# Patient Record
Sex: MALE | Race: WHITE | NOT HISPANIC OR LATINO | Employment: FULL TIME | URBAN - METROPOLITAN AREA
[De-identification: names, ages, dates, MRNs, and addresses within clinical notes are randomized per-mention and may not be internally consistent; named-entity substitution may affect disease eponyms.]

---

## 2020-03-13 ENCOUNTER — HOSPITAL ENCOUNTER (OUTPATIENT)
Facility: HOSPITAL | Age: 60
Setting detail: SURGERY ADMIT
End: 2020-03-13
Attending: ORTHOPAEDIC SURGERY | Admitting: ORTHOPAEDIC SURGERY
Payer: COMMERCIAL

## 2020-03-17 ENCOUNTER — HOSPITAL ENCOUNTER (OUTPATIENT)
Dept: NON INVASIVE DIAGNOSTICS | Facility: HOSPITAL | Age: 60
Discharge: HOME | End: 2020-03-17
Attending: ORTHOPAEDIC SURGERY
Payer: COMMERCIAL

## 2020-03-17 ENCOUNTER — HOSPITAL ENCOUNTER (OUTPATIENT)
Dept: RADIOLOGY | Facility: HOSPITAL | Age: 60
Discharge: HOME | End: 2020-03-17
Attending: ORTHOPAEDIC SURGERY
Payer: COMMERCIAL

## 2020-03-17 ENCOUNTER — APPOINTMENT (OUTPATIENT)
Dept: LAB | Facility: HOSPITAL | Age: 60
End: 2020-03-17
Attending: ORTHOPAEDIC SURGERY
Payer: COMMERCIAL

## 2020-03-17 ENCOUNTER — APPOINTMENT (OUTPATIENT)
Dept: PREADMISSION TESTING | Facility: HOSPITAL | Age: 60
End: 2020-03-17
Payer: COMMERCIAL

## 2020-03-17 ENCOUNTER — TRANSCRIBE ORDERS (OUTPATIENT)
Dept: ADMINISTRATIVE | Facility: HOSPITAL | Age: 60
End: 2020-03-17

## 2020-03-17 VITALS
HEIGHT: 71 IN | TEMPERATURE: 98.2 F | HEART RATE: 67 BPM | BODY MASS INDEX: 40.54 KG/M2 | WEIGHT: 289.6 LBS | RESPIRATION RATE: 18 BRPM | SYSTOLIC BLOOD PRESSURE: 120 MMHG | DIASTOLIC BLOOD PRESSURE: 70 MMHG | OXYGEN SATURATION: 94 %

## 2020-03-17 DIAGNOSIS — M19.011 ARTHRITIS OF RIGHT SHOULDER REGION: ICD-10-CM

## 2020-03-17 DIAGNOSIS — Z01.818 OTHER SPECIFIED PRE-OPERATIVE EXAMINATION: ICD-10-CM

## 2020-03-17 DIAGNOSIS — M19.011 ARTHRITIS OF RIGHT SHOULDER REGION: Primary | ICD-10-CM

## 2020-03-17 LAB
ALBUMIN SERPL BCP-MCNC: 3.5 G/DL (ref 3.5–5)
ALP SERPL-CCNC: 91 U/L (ref 46–116)
ALT SERPL W P-5'-P-CCNC: 24 U/L (ref 12–78)
ANION GAP SERPL CALCULATED.3IONS-SCNC: 6 MMOL/L (ref 4–13)
AST SERPL W P-5'-P-CCNC: 19 U/L (ref 5–45)
ATRIAL RATE: 60 BPM
BASOPHILS # BLD AUTO: 0.05 THOUSANDS/ΜL (ref 0–0.1)
BASOPHILS NFR BLD AUTO: 1 % (ref 0–1)
BILIRUB SERPL-MCNC: 0.47 MG/DL (ref 0.2–1)
BILIRUB UR QL STRIP: NEGATIVE
BUN SERPL-MCNC: 15 MG/DL (ref 5–25)
CALCIUM SERPL-MCNC: 9 MG/DL (ref 8.3–10.1)
CHLORIDE SERPL-SCNC: 103 MMOL/L (ref 100–108)
CLARITY UR: CLEAR
CO2 SERPL-SCNC: 31 MMOL/L (ref 21–32)
COLOR UR: YELLOW
CREAT SERPL-MCNC: 0.8 MG/DL (ref 0.6–1.3)
EOSINOPHIL # BLD AUTO: 0.18 THOUSAND/ΜL (ref 0–0.61)
EOSINOPHIL NFR BLD AUTO: 3 % (ref 0–6)
ERYTHROCYTE [DISTWIDTH] IN BLOOD BY AUTOMATED COUNT: 13.1 % (ref 11.6–15.1)
GFR SERPL CREATININE-BSD FRML MDRD: 98 ML/MIN/1.73SQ M
GLUCOSE SERPL-MCNC: 114 MG/DL (ref 65–140)
GLUCOSE UR STRIP-MCNC: NEGATIVE MG/DL
HCT VFR BLD AUTO: 45.7 % (ref 36.5–49.3)
HGB BLD-MCNC: 14.1 G/DL (ref 12–17)
HGB UR QL STRIP.AUTO: NEGATIVE
IMM GRANULOCYTES # BLD AUTO: 0.02 THOUSAND/UL (ref 0–0.2)
IMM GRANULOCYTES NFR BLD AUTO: 0 % (ref 0–2)
INR PPP: 0.98 (ref 0.84–1.19)
KETONES UR STRIP-MCNC: NEGATIVE MG/DL
LEUKOCYTE ESTERASE UR QL STRIP: NEGATIVE
LYMPHOCYTES # BLD AUTO: 1.16 THOUSANDS/ΜL (ref 0.6–4.47)
LYMPHOCYTES NFR BLD AUTO: 20 % (ref 14–44)
MCH RBC QN AUTO: 28.4 PG (ref 26.8–34.3)
MCHC RBC AUTO-ENTMCNC: 30.9 G/DL (ref 31.4–37.4)
MCV RBC AUTO: 92 FL (ref 82–98)
MONOCYTES # BLD AUTO: 0.59 THOUSAND/ΜL (ref 0.17–1.22)
MONOCYTES NFR BLD AUTO: 10 % (ref 4–12)
NEUTROPHILS # BLD AUTO: 3.85 THOUSANDS/ΜL (ref 1.85–7.62)
NEUTS SEG NFR BLD AUTO: 66 % (ref 43–75)
NITRITE UR QL STRIP: NEGATIVE
NRBC BLD AUTO-RTO: 0 /100 WBCS
P AXIS: 40 DEGREES
PH UR STRIP.AUTO: 6 [PH]
PLATELET # BLD AUTO: 271 THOUSANDS/UL (ref 149–390)
PMV BLD AUTO: 9.6 FL (ref 8.9–12.7)
POTASSIUM SERPL-SCNC: 4.2 MMOL/L (ref 3.5–5.3)
PR INTERVAL: 158 MS
PROT SERPL-MCNC: 7.5 G/DL (ref 6.4–8.2)
PROT UR STRIP-MCNC: NEGATIVE MG/DL
PROTHROMBIN TIME: 13.1 SECONDS (ref 11.6–14.5)
QRS AXIS: -11 DEGREES
QRSD INTERVAL: 88 MS
QT INTERVAL: 390 MS
QTC INTERVAL: 390 MS
RBC # BLD AUTO: 4.97 MILLION/UL (ref 3.88–5.62)
SODIUM SERPL-SCNC: 140 MMOL/L (ref 136–145)
SP GR UR STRIP.AUTO: 1.02 (ref 1–1.03)
T WAVE AXIS: 35 DEGREES
UROBILINOGEN UR QL STRIP.AUTO: 0.2 E.U./DL
VENTRICULAR RATE: 60 BPM
WBC # BLD AUTO: 5.85 THOUSAND/UL (ref 4.31–10.16)

## 2020-03-17 PROCEDURE — 93005 ELECTROCARDIOGRAM TRACING: CPT

## 2020-03-17 PROCEDURE — 93010 ELECTROCARDIOGRAM REPORT: CPT | Performed by: INTERNAL MEDICINE

## 2020-03-17 PROCEDURE — 85025 COMPLETE CBC W/AUTO DIFF WBC: CPT

## 2020-03-17 PROCEDURE — 36415 COLL VENOUS BLD VENIPUNCTURE: CPT

## 2020-03-17 PROCEDURE — 80053 COMPREHEN METABOLIC PANEL: CPT

## 2020-03-17 PROCEDURE — 85610 PROTHROMBIN TIME: CPT

## 2020-03-17 PROCEDURE — 81003 URINALYSIS AUTO W/O SCOPE: CPT | Performed by: ORTHOPAEDIC SURGERY

## 2020-03-17 PROCEDURE — 71046 X-RAY EXAM CHEST 2 VIEWS: CPT

## 2020-03-17 RX ORDER — ACETAMINOPHEN 500 MG
1000 TABLET ORAL EVERY 6 HOURS PRN
COMMUNITY

## 2020-03-17 RX ORDER — SODIUM CHLORIDE 9 MG/ML
125 INJECTION, SOLUTION INTRAVENOUS CONTINUOUS
Status: CANCELLED | OUTPATIENT
Start: 2020-03-17

## 2020-03-17 RX ORDER — ATORVASTATIN CALCIUM 80 MG/1
80 TABLET, FILM COATED ORAL DAILY
COMMUNITY

## 2020-03-17 RX ORDER — ASPIRIN 81 MG/1
81 TABLET ORAL DAILY
COMMUNITY
End: 2020-05-23 | Stop reason: HOSPADM

## 2020-03-17 RX ORDER — AMLODIPINE BESYLATE 5 MG/1
5 TABLET ORAL DAILY
COMMUNITY

## 2020-03-17 RX ORDER — METFORMIN HYDROCHLORIDE 500 MG/1
500 TABLET, FILM COATED, EXTENDED RELEASE ORAL EVERY EVENING
COMMUNITY

## 2020-03-17 NOTE — PRE-PROCEDURE INSTRUCTIONS
Pre-Surgery Instructions:   Medication Instructions    acetaminophen (TYLENOL) 500 mg tablet Instructed patient per Anesthesia Guidelines   amLODIPine (NORVASC) 5 mg tablet Instructed patient per Anesthesia Guidelines   aspirin (ECOTRIN LOW STRENGTH) 81 mg EC tablet Patient was instructed to contact Physician for medication instruction   atorvastatin (LIPITOR) 80 mg tablet Instructed patient per Anesthesia Guidelines   metFORMIN (GLUMETZA) 500 MG (MOD) 24 hr tablet Instructed patient per Anesthesia Guidelines   metoprolol tartrate (LOPRESSOR) 25 mg tablet Instructed patient per Anesthesia Guidelines  Instructed to take metoprolol and amlodipine am of surgery with sip val dial per anesthesia DR Melissa Lilly

## 2020-05-13 RX ORDER — CEFAZOLIN SODIUM 2 G/50ML
2000 SOLUTION INTRAVENOUS ONCE
Status: CANCELLED | OUTPATIENT
Start: 2020-05-22 | End: 2020-05-13

## 2020-05-15 DIAGNOSIS — Z11.59 SCREENING FOR VIRAL DISEASE: ICD-10-CM

## 2020-05-15 PROCEDURE — U0003 INFECTIOUS AGENT DETECTION BY NUCLEIC ACID (DNA OR RNA); SEVERE ACUTE RESPIRATORY SYNDROME CORONAVIRUS 2 (SARS-COV-2) (CORONAVIRUS DISEASE [COVID-19]), AMPLIFIED PROBE TECHNIQUE, MAKING USE OF HIGH THROUGHPUT TECHNOLOGIES AS DESCRIBED BY CMS-2020-01-R: HCPCS

## 2020-05-19 LAB — SARS-COV-2 RNA SPEC QL NAA+PROBE: NOT DETECTED

## 2020-05-21 ENCOUNTER — ANESTHESIA EVENT (OUTPATIENT)
Dept: PERIOP | Facility: HOSPITAL | Age: 60
DRG: 483 | End: 2020-05-21
Payer: COMMERCIAL

## 2020-05-22 ENCOUNTER — HOSPITAL ENCOUNTER (INPATIENT)
Facility: HOSPITAL | Age: 60
LOS: 1 days | Discharge: HOME HEALTH | DRG: 483 | End: 2020-05-23
Attending: ORTHOPAEDIC SURGERY | Admitting: ORTHOPAEDIC SURGERY
Payer: COMMERCIAL

## 2020-05-22 ENCOUNTER — APPOINTMENT (OUTPATIENT)
Dept: RADIOLOGY | Facility: HOSPITAL | Age: 60
DRG: 483 | End: 2020-05-22
Payer: COMMERCIAL

## 2020-05-22 ENCOUNTER — ANESTHESIA (OUTPATIENT)
Dept: PERIOP | Facility: HOSPITAL | Age: 60
DRG: 483 | End: 2020-05-22
Payer: COMMERCIAL

## 2020-05-22 DIAGNOSIS — Z11.59 SCREENING FOR VIRAL DISEASE: ICD-10-CM

## 2020-05-22 DIAGNOSIS — M19.011 PRIMARY OSTEOARTHRITIS OF RIGHT SHOULDER: Primary | ICD-10-CM

## 2020-05-22 LAB
ABO GROUP BLD: NORMAL
ABO GROUP BLD: NORMAL
BLD GP AB SCN SERPL QL: NEGATIVE
GLUCOSE SERPL-MCNC: 117 MG/DL (ref 65–140)
GLUCOSE SERPL-MCNC: 124 MG/DL (ref 65–140)
GLUCOSE SERPL-MCNC: 145 MG/DL (ref 65–140)
RH BLD: POSITIVE
RH BLD: POSITIVE
SPECIMEN EXPIRATION DATE: NORMAL

## 2020-05-22 PROCEDURE — C1776 JOINT DEVICE (IMPLANTABLE): HCPCS | Performed by: ORTHOPAEDIC SURGERY

## 2020-05-22 PROCEDURE — 73020 X-RAY EXAM OF SHOULDER: CPT

## 2020-05-22 PROCEDURE — 82948 REAGENT STRIP/BLOOD GLUCOSE: CPT

## 2020-05-22 PROCEDURE — C1713 ANCHOR/SCREW BN/BN,TIS/BN: HCPCS | Performed by: ORTHOPAEDIC SURGERY

## 2020-05-22 PROCEDURE — 86900 BLOOD TYPING SEROLOGIC ABO: CPT | Performed by: ORTHOPAEDIC SURGERY

## 2020-05-22 PROCEDURE — 86850 RBC ANTIBODY SCREEN: CPT | Performed by: ORTHOPAEDIC SURGERY

## 2020-05-22 PROCEDURE — 0RRJ0JZ REPLACEMENT OF RIGHT SHOULDER JOINT WITH SYNTHETIC SUBSTITUTE, OPEN APPROACH: ICD-10-PCS | Performed by: ORTHOPAEDIC SURGERY

## 2020-05-22 PROCEDURE — 86901 BLOOD TYPING SEROLOGIC RH(D): CPT | Performed by: ORTHOPAEDIC SURGERY

## 2020-05-22 DEVICE — IMPLANTABLE DEVICE: Type: IMPLANTABLE DEVICE | Site: SHOULDER | Status: FUNCTIONAL

## 2020-05-22 DEVICE — GLENOID W/ CLEAT MED: Type: IMPLANTABLE DEVICE | Site: SHOULDER | Status: FUNCTIONAL

## 2020-05-22 DEVICE — DEPUY CMW 2 FAST SET BONE CEMENT 20G: Type: IMPLANTABLE DEVICE | Site: SHOULDER | Status: FUNCTIONAL

## 2020-05-22 RX ORDER — ONDANSETRON 2 MG/ML
INJECTION INTRAMUSCULAR; INTRAVENOUS AS NEEDED
Status: DISCONTINUED | OUTPATIENT
Start: 2020-05-22 | End: 2020-05-22 | Stop reason: SURG

## 2020-05-22 RX ORDER — DOCUSATE SODIUM 100 MG/1
100 CAPSULE, LIQUID FILLED ORAL 2 TIMES DAILY
Status: DISCONTINUED | OUTPATIENT
Start: 2020-05-22 | End: 2020-05-23 | Stop reason: HOSPADM

## 2020-05-22 RX ORDER — GLYCOPYRROLATE 0.2 MG/ML
INJECTION INTRAMUSCULAR; INTRAVENOUS AS NEEDED
Status: DISCONTINUED | OUTPATIENT
Start: 2020-05-22 | End: 2020-05-22 | Stop reason: SURG

## 2020-05-22 RX ORDER — OXYCODONE HYDROCHLORIDE 5 MG/1
5 TABLET ORAL EVERY 4 HOURS PRN
Status: DISCONTINUED | OUTPATIENT
Start: 2020-05-22 | End: 2020-05-23 | Stop reason: HOSPADM

## 2020-05-22 RX ORDER — ROPIVACAINE HYDROCHLORIDE 5 MG/ML
INJECTION, SOLUTION EPIDURAL; INFILTRATION; PERINEURAL AS NEEDED
Status: DISCONTINUED | OUTPATIENT
Start: 2020-05-22 | End: 2020-05-22 | Stop reason: SURG

## 2020-05-22 RX ORDER — NEOSTIGMINE METHYLSULFATE 1 MG/ML
INJECTION INTRAVENOUS AS NEEDED
Status: DISCONTINUED | OUTPATIENT
Start: 2020-05-22 | End: 2020-05-22 | Stop reason: SURG

## 2020-05-22 RX ORDER — MEPERIDINE HYDROCHLORIDE 50 MG/ML
12.5 INJECTION INTRAMUSCULAR; INTRAVENOUS; SUBCUTANEOUS
Status: DISCONTINUED | OUTPATIENT
Start: 2020-05-22 | End: 2020-05-22 | Stop reason: HOSPADM

## 2020-05-22 RX ORDER — CEFAZOLIN SODIUM 1 G/50ML
1000 SOLUTION INTRAVENOUS EVERY 8 HOURS
Status: COMPLETED | OUTPATIENT
Start: 2020-05-22 | End: 2020-05-23

## 2020-05-22 RX ORDER — OXYCODONE HYDROCHLORIDE 10 MG/1
10 TABLET ORAL EVERY 6 HOURS PRN
Status: DISCONTINUED | OUTPATIENT
Start: 2020-05-22 | End: 2020-05-23 | Stop reason: HOSPADM

## 2020-05-22 RX ORDER — ROCURONIUM BROMIDE 10 MG/ML
INJECTION, SOLUTION INTRAVENOUS AS NEEDED
Status: DISCONTINUED | OUTPATIENT
Start: 2020-05-22 | End: 2020-05-22 | Stop reason: SURG

## 2020-05-22 RX ORDER — ASPIRIN 325 MG
325 TABLET ORAL 2 TIMES DAILY
Refills: 0
Start: 2020-05-22

## 2020-05-22 RX ORDER — AMLODIPINE BESYLATE 5 MG/1
5 TABLET ORAL DAILY
Status: DISCONTINUED | OUTPATIENT
Start: 2020-05-23 | End: 2020-05-23 | Stop reason: HOSPADM

## 2020-05-22 RX ORDER — ASPIRIN 325 MG
325 TABLET ORAL 2 TIMES DAILY
Status: DISCONTINUED | OUTPATIENT
Start: 2020-05-22 | End: 2020-05-23 | Stop reason: HOSPADM

## 2020-05-22 RX ORDER — FENTANYL CITRATE/PF 50 MCG/ML
25 SYRINGE (ML) INJECTION
Status: DISCONTINUED | OUTPATIENT
Start: 2020-05-22 | End: 2020-05-22 | Stop reason: HOSPADM

## 2020-05-22 RX ORDER — FENTANYL CITRATE 50 UG/ML
INJECTION, SOLUTION INTRAMUSCULAR; INTRAVENOUS AS NEEDED
Status: DISCONTINUED | OUTPATIENT
Start: 2020-05-22 | End: 2020-05-22 | Stop reason: SURG

## 2020-05-22 RX ORDER — ONDANSETRON 2 MG/ML
4 INJECTION INTRAMUSCULAR; INTRAVENOUS EVERY 6 HOURS PRN
Status: DISCONTINUED | OUTPATIENT
Start: 2020-05-22 | End: 2020-05-23 | Stop reason: HOSPADM

## 2020-05-22 RX ORDER — HYDROMORPHONE HCL/PF 1 MG/ML
0.5 SYRINGE (ML) INJECTION EVERY 2 HOUR PRN
Status: DISCONTINUED | OUTPATIENT
Start: 2020-05-22 | End: 2020-05-23 | Stop reason: HOSPADM

## 2020-05-22 RX ORDER — PROPOFOL 10 MG/ML
INJECTION, EMULSION INTRAVENOUS AS NEEDED
Status: DISCONTINUED | OUTPATIENT
Start: 2020-05-22 | End: 2020-05-22 | Stop reason: SURG

## 2020-05-22 RX ORDER — MAGNESIUM HYDROXIDE 1200 MG/15ML
LIQUID ORAL AS NEEDED
Status: DISCONTINUED | OUTPATIENT
Start: 2020-05-22 | End: 2020-05-22 | Stop reason: HOSPADM

## 2020-05-22 RX ORDER — ACETAMINOPHEN 325 MG/1
650 TABLET ORAL EVERY 6 HOURS PRN
Status: DISCONTINUED | OUTPATIENT
Start: 2020-05-22 | End: 2020-05-23 | Stop reason: HOSPADM

## 2020-05-22 RX ORDER — SENNOSIDES 8.6 MG
1 TABLET ORAL DAILY
Status: DISCONTINUED | OUTPATIENT
Start: 2020-05-22 | End: 2020-05-23 | Stop reason: HOSPADM

## 2020-05-22 RX ORDER — OXYCODONE HYDROCHLORIDE 5 MG/1
5 TABLET ORAL EVERY 4 HOURS PRN
Qty: 30 TABLET | Refills: 0
Start: 2020-05-22 | End: 2020-06-01

## 2020-05-22 RX ORDER — METFORMIN HYDROCHLORIDE 500 MG/1
500 TABLET, EXTENDED RELEASE ORAL EVERY EVENING
Status: DISCONTINUED | OUTPATIENT
Start: 2020-05-22 | End: 2020-05-23 | Stop reason: HOSPADM

## 2020-05-22 RX ORDER — SODIUM CHLORIDE, SODIUM LACTATE, POTASSIUM CHLORIDE, CALCIUM CHLORIDE 600; 310; 30; 20 MG/100ML; MG/100ML; MG/100ML; MG/100ML
100 INJECTION, SOLUTION INTRAVENOUS CONTINUOUS
Status: DISCONTINUED | OUTPATIENT
Start: 2020-05-22 | End: 2020-05-23 | Stop reason: HOSPADM

## 2020-05-22 RX ORDER — DOCUSATE SODIUM 100 MG/1
100 CAPSULE, LIQUID FILLED ORAL 2 TIMES DAILY
Qty: 10 CAPSULE | Refills: 0
Start: 2020-05-22

## 2020-05-22 RX ORDER — SENNOSIDES 8.6 MG
1 TABLET ORAL DAILY
Qty: 120 EACH | Refills: 0
Start: 2020-05-23

## 2020-05-22 RX ORDER — SODIUM CHLORIDE, SODIUM LACTATE, POTASSIUM CHLORIDE, CALCIUM CHLORIDE 600; 310; 30; 20 MG/100ML; MG/100ML; MG/100ML; MG/100ML
125 INJECTION, SOLUTION INTRAVENOUS CONTINUOUS
Status: DISCONTINUED | OUTPATIENT
Start: 2020-05-22 | End: 2020-05-23 | Stop reason: HOSPADM

## 2020-05-22 RX ORDER — ONDANSETRON 2 MG/ML
4 INJECTION INTRAMUSCULAR; INTRAVENOUS ONCE AS NEEDED
Status: DISCONTINUED | OUTPATIENT
Start: 2020-05-22 | End: 2020-05-22 | Stop reason: HOSPADM

## 2020-05-22 RX ORDER — EPHEDRINE SULFATE 50 MG/ML
INJECTION INTRAVENOUS AS NEEDED
Status: DISCONTINUED | OUTPATIENT
Start: 2020-05-22 | End: 2020-05-22 | Stop reason: SURG

## 2020-05-22 RX ORDER — HYDROMORPHONE HCL/PF 1 MG/ML
0.5 SYRINGE (ML) INJECTION
Status: DISCONTINUED | OUTPATIENT
Start: 2020-05-22 | End: 2020-05-22 | Stop reason: HOSPADM

## 2020-05-22 RX ORDER — MIDAZOLAM HYDROCHLORIDE 2 MG/2ML
INJECTION, SOLUTION INTRAMUSCULAR; INTRAVENOUS AS NEEDED
Status: DISCONTINUED | OUTPATIENT
Start: 2020-05-22 | End: 2020-05-22 | Stop reason: SURG

## 2020-05-22 RX ORDER — KETOROLAC TROMETHAMINE 30 MG/ML
15 INJECTION, SOLUTION INTRAMUSCULAR; INTRAVENOUS EVERY 6 HOURS PRN
Status: DISCONTINUED | OUTPATIENT
Start: 2020-05-22 | End: 2020-05-23

## 2020-05-22 RX ADMIN — EPHEDRINE SULFATE 10 MG: 50 INJECTION, SOLUTION INTRAVENOUS at 10:12

## 2020-05-22 RX ADMIN — EPHEDRINE SULFATE 10 MG: 50 INJECTION, SOLUTION INTRAVENOUS at 10:27

## 2020-05-22 RX ADMIN — Medication 3000 MG: at 09:29

## 2020-05-22 RX ADMIN — SODIUM CHLORIDE, SODIUM LACTATE, POTASSIUM CHLORIDE, AND CALCIUM CHLORIDE: .6; .31; .03; .02 INJECTION, SOLUTION INTRAVENOUS at 10:45

## 2020-05-22 RX ADMIN — FENTANYL CITRATE 50 MCG: 50 INJECTION, SOLUTION INTRAMUSCULAR; INTRAVENOUS at 09:37

## 2020-05-22 RX ADMIN — HYDROMORPHONE HYDROCHLORIDE 0.5 MG: 1 INJECTION, SOLUTION INTRAMUSCULAR; INTRAVENOUS; SUBCUTANEOUS at 22:38

## 2020-05-22 RX ADMIN — ROCURONIUM BROMIDE 50 MG: 10 INJECTION, SOLUTION INTRAVENOUS at 09:37

## 2020-05-22 RX ADMIN — PROPOFOL 200 MG: 10 INJECTION, EMULSION INTRAVENOUS at 09:37

## 2020-05-22 RX ADMIN — ONDANSETRON 4 MG: 2 INJECTION INTRAMUSCULAR; INTRAVENOUS at 11:04

## 2020-05-22 RX ADMIN — FENTANYL CITRATE 100 MCG: 50 INJECTION, SOLUTION INTRAMUSCULAR; INTRAVENOUS at 08:43

## 2020-05-22 RX ADMIN — SODIUM CHLORIDE, SODIUM LACTATE, POTASSIUM CHLORIDE, AND CALCIUM CHLORIDE: .6; .31; .03; .02 INJECTION, SOLUTION INTRAVENOUS at 09:31

## 2020-05-22 RX ADMIN — ROCURONIUM BROMIDE 20 MG: 10 INJECTION, SOLUTION INTRAVENOUS at 10:40

## 2020-05-22 RX ADMIN — ROPIVACAINE HYDROCHLORIDE 30 ML: 5 INJECTION, SOLUTION EPIDURAL; INFILTRATION; PERINEURAL at 08:47

## 2020-05-22 RX ADMIN — GLYCOPYRROLATE 0.8 MG: 0.2 INJECTION INTRAMUSCULAR; INTRAVENOUS at 11:15

## 2020-05-22 RX ADMIN — CEFAZOLIN SODIUM 1000 MG: 1 SOLUTION INTRAVENOUS at 17:21

## 2020-05-22 RX ADMIN — SENNOSIDES 8.6 MG: 8.6 TABLET, FILM COATED ORAL at 14:49

## 2020-05-22 RX ADMIN — EPHEDRINE SULFATE 10 MG: 50 INJECTION, SOLUTION INTRAVENOUS at 09:51

## 2020-05-22 RX ADMIN — EPHEDRINE SULFATE 10 MG: 50 INJECTION, SOLUTION INTRAVENOUS at 09:54

## 2020-05-22 RX ADMIN — NEOSTIGMINE METHYLSULFATE 4 MG: 1 INJECTION, SOLUTION INTRAVENOUS at 11:15

## 2020-05-22 RX ADMIN — DOCUSATE SODIUM 100 MG: 100 CAPSULE, LIQUID FILLED ORAL at 17:21

## 2020-05-22 RX ADMIN — FENTANYL CITRATE 25 MCG: 50 INJECTION, SOLUTION INTRAMUSCULAR; INTRAVENOUS at 10:51

## 2020-05-22 RX ADMIN — METFORMIN HYDROCHLORIDE 500 MG: 500 TABLET, EXTENDED RELEASE ORAL at 20:47

## 2020-05-22 RX ADMIN — OXYCODONE HYDROCHLORIDE 10 MG: 10 TABLET ORAL at 19:50

## 2020-05-22 RX ADMIN — LIDOCAINE HYDROCHLORIDE 100 MG: 20 INJECTION, SOLUTION INTRAVENOUS at 09:37

## 2020-05-22 RX ADMIN — METOPROLOL TARTRATE 25 MG: 25 TABLET, FILM COATED ORAL at 20:47

## 2020-05-22 RX ADMIN — OXYCODONE HYDROCHLORIDE 5 MG: 5 TABLET ORAL at 14:49

## 2020-05-22 RX ADMIN — MIDAZOLAM 4 MG: 1 INJECTION INTRAMUSCULAR; INTRAVENOUS at 08:43

## 2020-05-22 RX ADMIN — ASPIRIN 325 MG ORAL TABLET 325 MG: 325 PILL ORAL at 17:21

## 2020-05-23 VITALS
BODY MASS INDEX: 41.02 KG/M2 | DIASTOLIC BLOOD PRESSURE: 78 MMHG | TEMPERATURE: 98.5 F | HEIGHT: 71 IN | SYSTOLIC BLOOD PRESSURE: 133 MMHG | OXYGEN SATURATION: 93 % | RESPIRATION RATE: 20 BRPM | HEART RATE: 64 BPM | WEIGHT: 293 LBS

## 2020-05-23 LAB
ANION GAP SERPL CALCULATED.3IONS-SCNC: 8 MMOL/L (ref 4–13)
BUN SERPL-MCNC: 11 MG/DL (ref 5–25)
CALCIUM SERPL-MCNC: 8.4 MG/DL (ref 8.3–10.1)
CHLORIDE SERPL-SCNC: 100 MMOL/L (ref 100–108)
CO2 SERPL-SCNC: 29 MMOL/L (ref 21–32)
CREAT SERPL-MCNC: 0.85 MG/DL (ref 0.6–1.3)
GFR SERPL CREATININE-BSD FRML MDRD: 95 ML/MIN/1.73SQ M
GLUCOSE SERPL-MCNC: 133 MG/DL (ref 65–140)
GLUCOSE SERPL-MCNC: 145 MG/DL (ref 65–140)
GLUCOSE SERPL-MCNC: 154 MG/DL (ref 65–140)
POTASSIUM SERPL-SCNC: 4.2 MMOL/L (ref 3.5–5.3)
SODIUM SERPL-SCNC: 137 MMOL/L (ref 136–145)

## 2020-05-23 PROCEDURE — 80048 BASIC METABOLIC PNL TOTAL CA: CPT | Performed by: PHYSICIAN ASSISTANT

## 2020-05-23 PROCEDURE — 97163 PT EVAL HIGH COMPLEX 45 MIN: CPT

## 2020-05-23 PROCEDURE — 97166 OT EVAL MOD COMPLEX 45 MIN: CPT

## 2020-05-23 PROCEDURE — 97535 SELF CARE MNGMENT TRAINING: CPT

## 2020-05-23 PROCEDURE — 82948 REAGENT STRIP/BLOOD GLUCOSE: CPT

## 2020-05-23 RX ORDER — KETOROLAC TROMETHAMINE 30 MG/ML
15 INJECTION, SOLUTION INTRAMUSCULAR; INTRAVENOUS EVERY 6 HOURS PRN
Status: DISCONTINUED | OUTPATIENT
Start: 2020-05-23 | End: 2020-05-23 | Stop reason: HOSPADM

## 2020-05-23 RX ADMIN — SODIUM CHLORIDE, SODIUM LACTATE, POTASSIUM CHLORIDE, AND CALCIUM CHLORIDE 100 ML/HR: .6; .31; .03; .02 INJECTION, SOLUTION INTRAVENOUS at 04:56

## 2020-05-23 RX ADMIN — ASPIRIN 325 MG ORAL TABLET 325 MG: 325 PILL ORAL at 08:11

## 2020-05-23 RX ADMIN — CEFAZOLIN SODIUM 1000 MG: 1 SOLUTION INTRAVENOUS at 01:20

## 2020-05-23 RX ADMIN — DOCUSATE SODIUM 100 MG: 100 CAPSULE, LIQUID FILLED ORAL at 08:11

## 2020-05-23 RX ADMIN — INSULIN LISPRO 1 UNITS: 100 INJECTION, SOLUTION INTRAVENOUS; SUBCUTANEOUS at 08:10

## 2020-05-23 RX ADMIN — SENNOSIDES 8.6 MG: 8.6 TABLET, FILM COATED ORAL at 08:11

## 2020-05-23 RX ADMIN — OXYCODONE HYDROCHLORIDE 10 MG: 10 TABLET ORAL at 08:11

## 2020-05-23 RX ADMIN — AMLODIPINE BESYLATE 5 MG: 5 TABLET ORAL at 08:14

## 2020-05-23 RX ADMIN — METOPROLOL TARTRATE 25 MG: 25 TABLET, FILM COATED ORAL at 08:11

## 2020-05-23 RX ADMIN — OXYCODONE HYDROCHLORIDE 5 MG: 5 TABLET ORAL at 01:25

## 2020-06-08 ENCOUNTER — EVALUATION (OUTPATIENT)
Dept: PHYSICAL THERAPY | Facility: CLINIC | Age: 60
End: 2020-06-08
Payer: COMMERCIAL

## 2020-06-08 DIAGNOSIS — Z96.611 AFTERCARE FOLLOWING RIGHT SHOULDER JOINT REPLACEMENT SURGERY: ICD-10-CM

## 2020-06-08 DIAGNOSIS — Z47.1 AFTERCARE FOLLOWING RIGHT SHOULDER JOINT REPLACEMENT SURGERY: ICD-10-CM

## 2020-06-08 DIAGNOSIS — M25.511 ACUTE PAIN OF RIGHT SHOULDER: Primary | ICD-10-CM

## 2020-06-08 PROCEDURE — 97162 PT EVAL MOD COMPLEX 30 MIN: CPT | Performed by: PHYSICAL THERAPIST

## 2020-06-08 PROCEDURE — 97140 MANUAL THERAPY 1/> REGIONS: CPT | Performed by: PHYSICAL THERAPIST

## 2020-06-08 PROCEDURE — 97112 NEUROMUSCULAR REEDUCATION: CPT | Performed by: PHYSICAL THERAPIST

## 2020-06-08 PROCEDURE — 97110 THERAPEUTIC EXERCISES: CPT | Performed by: PHYSICAL THERAPIST

## 2020-06-09 ENCOUNTER — TRANSCRIBE ORDERS (OUTPATIENT)
Dept: PHYSICAL THERAPY | Facility: CLINIC | Age: 60
End: 2020-06-09

## 2020-06-09 DIAGNOSIS — M25.511 ACUTE PAIN OF RIGHT SHOULDER: Primary | ICD-10-CM

## 2020-06-09 DIAGNOSIS — Z96.611 AFTERCARE FOLLOWING RIGHT SHOULDER JOINT REPLACEMENT SURGERY: ICD-10-CM

## 2020-06-09 DIAGNOSIS — Z47.1 AFTERCARE FOLLOWING RIGHT SHOULDER JOINT REPLACEMENT SURGERY: ICD-10-CM

## 2020-06-10 ENCOUNTER — OFFICE VISIT (OUTPATIENT)
Dept: PHYSICAL THERAPY | Facility: CLINIC | Age: 60
End: 2020-06-10
Payer: COMMERCIAL

## 2020-06-10 ENCOUNTER — APPOINTMENT (OUTPATIENT)
Dept: PHYSICAL THERAPY | Facility: CLINIC | Age: 60
End: 2020-06-10
Payer: COMMERCIAL

## 2020-06-10 DIAGNOSIS — Z47.1 AFTERCARE FOLLOWING RIGHT SHOULDER JOINT REPLACEMENT SURGERY: ICD-10-CM

## 2020-06-10 DIAGNOSIS — M25.511 ACUTE PAIN OF RIGHT SHOULDER: Primary | ICD-10-CM

## 2020-06-10 DIAGNOSIS — Z96.611 AFTERCARE FOLLOWING RIGHT SHOULDER JOINT REPLACEMENT SURGERY: ICD-10-CM

## 2020-06-10 PROCEDURE — 97140 MANUAL THERAPY 1/> REGIONS: CPT

## 2020-06-10 PROCEDURE — 97112 NEUROMUSCULAR REEDUCATION: CPT

## 2020-06-11 ENCOUNTER — APPOINTMENT (OUTPATIENT)
Dept: PHYSICAL THERAPY | Facility: CLINIC | Age: 60
End: 2020-06-11
Payer: COMMERCIAL

## 2020-06-11 ENCOUNTER — OFFICE VISIT (OUTPATIENT)
Dept: PHYSICAL THERAPY | Facility: CLINIC | Age: 60
End: 2020-06-11
Payer: COMMERCIAL

## 2020-06-11 DIAGNOSIS — Z47.1 AFTERCARE FOLLOWING RIGHT SHOULDER JOINT REPLACEMENT SURGERY: ICD-10-CM

## 2020-06-11 DIAGNOSIS — Z96.611 AFTERCARE FOLLOWING RIGHT SHOULDER JOINT REPLACEMENT SURGERY: ICD-10-CM

## 2020-06-11 DIAGNOSIS — M25.511 ACUTE PAIN OF RIGHT SHOULDER: Primary | ICD-10-CM

## 2020-06-11 PROCEDURE — 97535 SELF CARE MNGMENT TRAINING: CPT

## 2020-06-11 PROCEDURE — 97140 MANUAL THERAPY 1/> REGIONS: CPT

## 2020-06-11 PROCEDURE — 97112 NEUROMUSCULAR REEDUCATION: CPT

## 2020-06-15 ENCOUNTER — OFFICE VISIT (OUTPATIENT)
Dept: PHYSICAL THERAPY | Facility: CLINIC | Age: 60
End: 2020-06-15
Payer: COMMERCIAL

## 2020-06-15 DIAGNOSIS — Z47.1 AFTERCARE FOLLOWING RIGHT SHOULDER JOINT REPLACEMENT SURGERY: ICD-10-CM

## 2020-06-15 DIAGNOSIS — M25.511 ACUTE PAIN OF RIGHT SHOULDER: Primary | ICD-10-CM

## 2020-06-15 DIAGNOSIS — Z96.611 AFTERCARE FOLLOWING RIGHT SHOULDER JOINT REPLACEMENT SURGERY: ICD-10-CM

## 2020-06-15 PROCEDURE — 97112 NEUROMUSCULAR REEDUCATION: CPT | Performed by: PHYSICAL THERAPIST

## 2020-06-15 PROCEDURE — 97140 MANUAL THERAPY 1/> REGIONS: CPT | Performed by: PHYSICAL THERAPIST

## 2020-06-15 PROCEDURE — 97110 THERAPEUTIC EXERCISES: CPT | Performed by: PHYSICAL THERAPIST

## 2020-06-17 ENCOUNTER — OFFICE VISIT (OUTPATIENT)
Dept: PHYSICAL THERAPY | Facility: CLINIC | Age: 60
End: 2020-06-17
Payer: COMMERCIAL

## 2020-06-17 DIAGNOSIS — Z47.1 AFTERCARE FOLLOWING RIGHT SHOULDER JOINT REPLACEMENT SURGERY: ICD-10-CM

## 2020-06-17 DIAGNOSIS — Z96.611 AFTERCARE FOLLOWING RIGHT SHOULDER JOINT REPLACEMENT SURGERY: ICD-10-CM

## 2020-06-17 DIAGNOSIS — M25.511 ACUTE PAIN OF RIGHT SHOULDER: Primary | ICD-10-CM

## 2020-06-17 PROCEDURE — 97140 MANUAL THERAPY 1/> REGIONS: CPT

## 2020-06-17 PROCEDURE — 97112 NEUROMUSCULAR REEDUCATION: CPT

## 2020-06-18 ENCOUNTER — OFFICE VISIT (OUTPATIENT)
Dept: PHYSICAL THERAPY | Facility: CLINIC | Age: 60
End: 2020-06-18
Payer: COMMERCIAL

## 2020-06-18 DIAGNOSIS — Z47.1 AFTERCARE FOLLOWING RIGHT SHOULDER JOINT REPLACEMENT SURGERY: ICD-10-CM

## 2020-06-18 DIAGNOSIS — Z96.611 AFTERCARE FOLLOWING RIGHT SHOULDER JOINT REPLACEMENT SURGERY: ICD-10-CM

## 2020-06-18 DIAGNOSIS — M25.511 ACUTE PAIN OF RIGHT SHOULDER: Primary | ICD-10-CM

## 2020-06-18 PROCEDURE — 97140 MANUAL THERAPY 1/> REGIONS: CPT

## 2020-06-18 PROCEDURE — 97112 NEUROMUSCULAR REEDUCATION: CPT

## 2020-06-22 ENCOUNTER — OFFICE VISIT (OUTPATIENT)
Dept: PHYSICAL THERAPY | Facility: CLINIC | Age: 60
End: 2020-06-22
Payer: COMMERCIAL

## 2020-06-22 DIAGNOSIS — M25.511 ACUTE PAIN OF RIGHT SHOULDER: Primary | ICD-10-CM

## 2020-06-22 DIAGNOSIS — Z47.1 AFTERCARE FOLLOWING RIGHT SHOULDER JOINT REPLACEMENT SURGERY: ICD-10-CM

## 2020-06-22 DIAGNOSIS — Z96.611 AFTERCARE FOLLOWING RIGHT SHOULDER JOINT REPLACEMENT SURGERY: ICD-10-CM

## 2020-06-22 PROCEDURE — 97112 NEUROMUSCULAR REEDUCATION: CPT

## 2020-06-22 PROCEDURE — 97140 MANUAL THERAPY 1/> REGIONS: CPT

## 2020-06-24 ENCOUNTER — OFFICE VISIT (OUTPATIENT)
Dept: PHYSICAL THERAPY | Facility: CLINIC | Age: 60
End: 2020-06-24
Payer: COMMERCIAL

## 2020-06-24 DIAGNOSIS — Z96.611 AFTERCARE FOLLOWING RIGHT SHOULDER JOINT REPLACEMENT SURGERY: ICD-10-CM

## 2020-06-24 DIAGNOSIS — M25.511 ACUTE PAIN OF RIGHT SHOULDER: Primary | ICD-10-CM

## 2020-06-24 DIAGNOSIS — Z47.1 AFTERCARE FOLLOWING RIGHT SHOULDER JOINT REPLACEMENT SURGERY: ICD-10-CM

## 2020-06-24 PROCEDURE — 97140 MANUAL THERAPY 1/> REGIONS: CPT

## 2020-06-24 PROCEDURE — 97112 NEUROMUSCULAR REEDUCATION: CPT

## 2020-06-25 ENCOUNTER — APPOINTMENT (OUTPATIENT)
Dept: PHYSICAL THERAPY | Facility: CLINIC | Age: 60
End: 2020-06-25
Payer: COMMERCIAL

## 2020-06-26 ENCOUNTER — OFFICE VISIT (OUTPATIENT)
Dept: PHYSICAL THERAPY | Facility: CLINIC | Age: 60
End: 2020-06-26
Payer: COMMERCIAL

## 2020-06-26 DIAGNOSIS — M25.511 ACUTE PAIN OF RIGHT SHOULDER: ICD-10-CM

## 2020-06-26 DIAGNOSIS — Z96.611 AFTERCARE FOLLOWING RIGHT SHOULDER JOINT REPLACEMENT SURGERY: Primary | ICD-10-CM

## 2020-06-26 DIAGNOSIS — Z47.1 AFTERCARE FOLLOWING RIGHT SHOULDER JOINT REPLACEMENT SURGERY: Primary | ICD-10-CM

## 2020-06-26 PROCEDURE — 97110 THERAPEUTIC EXERCISES: CPT | Performed by: PHYSICAL THERAPIST

## 2020-06-26 PROCEDURE — 97140 MANUAL THERAPY 1/> REGIONS: CPT | Performed by: PHYSICAL THERAPIST

## 2020-06-26 PROCEDURE — 97112 NEUROMUSCULAR REEDUCATION: CPT | Performed by: PHYSICAL THERAPIST

## 2020-06-29 ENCOUNTER — OFFICE VISIT (OUTPATIENT)
Dept: PHYSICAL THERAPY | Facility: CLINIC | Age: 60
End: 2020-06-29
Payer: COMMERCIAL

## 2020-06-29 DIAGNOSIS — M25.511 ACUTE PAIN OF RIGHT SHOULDER: ICD-10-CM

## 2020-06-29 DIAGNOSIS — Z47.1 AFTERCARE FOLLOWING RIGHT SHOULDER JOINT REPLACEMENT SURGERY: Primary | ICD-10-CM

## 2020-06-29 DIAGNOSIS — Z96.611 AFTERCARE FOLLOWING RIGHT SHOULDER JOINT REPLACEMENT SURGERY: Primary | ICD-10-CM

## 2020-06-29 PROCEDURE — 97112 NEUROMUSCULAR REEDUCATION: CPT

## 2020-06-29 PROCEDURE — 97140 MANUAL THERAPY 1/> REGIONS: CPT

## 2020-07-01 ENCOUNTER — OFFICE VISIT (OUTPATIENT)
Dept: PHYSICAL THERAPY | Facility: CLINIC | Age: 60
End: 2020-07-01
Payer: COMMERCIAL

## 2020-07-01 DIAGNOSIS — Z47.1 AFTERCARE FOLLOWING RIGHT SHOULDER JOINT REPLACEMENT SURGERY: Primary | ICD-10-CM

## 2020-07-01 DIAGNOSIS — M25.511 ACUTE PAIN OF RIGHT SHOULDER: ICD-10-CM

## 2020-07-01 DIAGNOSIS — Z96.611 AFTERCARE FOLLOWING RIGHT SHOULDER JOINT REPLACEMENT SURGERY: Primary | ICD-10-CM

## 2020-07-01 PROCEDURE — 97112 NEUROMUSCULAR REEDUCATION: CPT

## 2020-07-01 PROCEDURE — 97140 MANUAL THERAPY 1/> REGIONS: CPT

## 2020-07-01 NOTE — PROGRESS NOTES
Today's date: 2020  Patient name: Kym Albright  : 1960  MRN: [id number]  Referring provider: Maricarmen Chi MD  Dx:   Encounter Diagnosis     ICD-10-CM    1  Aftercare following right shoulder joint replacement surgery Z47 1     Z96 611    2  Acute pain of right shoulder M25 511      Subjective:  No new c/o pain today  Objective: See treatment log below  Lisa Morales continues to be advised to follow his home exercise program as tolerated  When Lisa Morales is feeling good he follows his rehab exercises in the gym and on other days he follows his home exercise program at home as tolerated  In the future we plan on having Pedro stay at home and follow his home exercise program for four to six weeks and than assess for either more Rehab treatments or discharge from Rehab care  Assessment: Tolerated treatment well  Patient exhibited good technique with therapeutic exercises and would benefit from continued PT to increase R shoulder ROM/strength and endurance to improve mobility  Pedro's goals are to continue to improve with his rehab program and improve with functional mobility, speed, repetition and decreased c/o pain with his gym and home exercise program   Pedro's final goal for his rehab is to be discharged from Rehab care after obtaining his full functional rehab potential     Plan: Continue per plan of care  Progress treatment as tolerated  Precautions: Right Total Shoulder Replacement Surgery    All treatments below will be provided with a focus on strengthening, flexibility, ROM, postural,   endurance and any possible swelling and pain which may be present without ignoring   neural issues involving balance, coordination and proprioception which is also important   and necessary to provide full functional mobility and quality care        Daily Treatment Log  Manual     MT, ROM 30'    30'   HEP/Self Care        Exercise Log    UBC 10'    10' FWC-Codmans 3#-30x    3# 30x   FWC-Curls,Tri 3#-30x    3# 30x   Finger Ladder     3x ea   Sveta-BP,PD,Lats,Row        NuStep NT       W/P-PNF,IR,ER,PU,PS,Throw-Top,Mid,Bot     5# 30x   W/P-OH 2x10'    10'   Rotation Bar Sup/Pro     30x   RedBallEx 3x30 Table    3x30   ME, PE 15'    15'           Modalities 7/1 6/29   MH/PE 20'    20'

## 2020-07-02 ENCOUNTER — APPOINTMENT (OUTPATIENT)
Dept: PHYSICAL THERAPY | Facility: CLINIC | Age: 60
End: 2020-07-02
Payer: COMMERCIAL

## 2020-07-06 ENCOUNTER — OFFICE VISIT (OUTPATIENT)
Dept: PHYSICAL THERAPY | Facility: CLINIC | Age: 60
End: 2020-07-06
Payer: COMMERCIAL

## 2020-07-06 DIAGNOSIS — M25.511 ACUTE PAIN OF RIGHT SHOULDER: ICD-10-CM

## 2020-07-06 DIAGNOSIS — Z47.1 AFTERCARE FOLLOWING RIGHT SHOULDER JOINT REPLACEMENT SURGERY: Primary | ICD-10-CM

## 2020-07-06 DIAGNOSIS — Z96.611 AFTERCARE FOLLOWING RIGHT SHOULDER JOINT REPLACEMENT SURGERY: Primary | ICD-10-CM

## 2020-07-06 PROCEDURE — 97140 MANUAL THERAPY 1/> REGIONS: CPT

## 2020-07-06 PROCEDURE — 97112 NEUROMUSCULAR REEDUCATION: CPT | Performed by: PHYSICAL THERAPIST

## 2020-07-06 PROCEDURE — 97110 THERAPEUTIC EXERCISES: CPT

## 2020-07-06 NOTE — PROGRESS NOTES
Today's date: 2020  Patient name: Fredy Long  : 1960  MRN: [id number]  Referring provider: Jose Campa MD  Dx:   Encounter Diagnosis     ICD-10-CM    1  Aftercare following right shoulder joint replacement surgery Z47 1     Z96 611    2  Acute pain of right shoulder M25 511      Subjective:  No new c/o pain today  Objective: See treatment log below  [place] continues to be advised to follow his home exercise program as tolerated  When [place] is feeling good he follows his rehab exercises in the gym and on other days he follows his home exercise program at home as tolerated  In the future we plan on having Pedro stay at home and follow his home exercise program for four to six weeks and than assess for either more Rehab treatments or discharge from Rehab care  Assessment: Tolerated treatment well  Patient exhibited good technique with therapeutic exercises and would benefit from continued PT to increase R shoulder ROM/strength and endurance to improve mobility  Pedro's goals are to continue to improve with his rehab program and improve with functional mobility, speed, repetition and decreased c/o pain with his gym and home exercise program   [place]'s final goal for his rehab is to be discharged from Rehab care after obtaining his full functional rehab potential     Plan: Continue per plan of care  Progress treatment as tolerated  Precautions: Right Total Shoulder Replacement Surgery    All treatments below will be provided with a focus on strengthening, flexibility, ROM, postural,   endurance and any possible swelling and pain which may be present without ignoring   neural issues involving balance, coordination and proprioception which is also important   and necessary to provide full functional mobility and quality care        Daily Treatment Log  Manual        MT, ROM 30' 20'      HEP/Self Care        Exercise Log       UBC 10' 10' FWC-Codmans 3#-30x HEP      FWC-Curls,Tri 3#-30x HEP      Finger Ladder        Sveta-BP,PD,Lats,Row  20# 20x      NuStep NT       W/P-PNF,IR,ER,PU,PS,Throw-Top,Mid,Bot  5# 30x      W/P-OH 2x10' 10'      Rotation Bar Sup/Pro  30x      RedBallEx 3x30 Table 3x30 Wall      ME, PE 15' 15'              Modalities 7/1 7/6      MH/PE 20' 20'

## 2020-07-08 ENCOUNTER — OFFICE VISIT (OUTPATIENT)
Dept: PHYSICAL THERAPY | Facility: CLINIC | Age: 60
End: 2020-07-08
Payer: COMMERCIAL

## 2020-07-08 DIAGNOSIS — Z96.611 AFTERCARE FOLLOWING RIGHT SHOULDER JOINT REPLACEMENT SURGERY: Primary | ICD-10-CM

## 2020-07-08 DIAGNOSIS — M25.511 ACUTE PAIN OF RIGHT SHOULDER: ICD-10-CM

## 2020-07-08 DIAGNOSIS — Z47.1 AFTERCARE FOLLOWING RIGHT SHOULDER JOINT REPLACEMENT SURGERY: Primary | ICD-10-CM

## 2020-07-08 PROCEDURE — 97140 MANUAL THERAPY 1/> REGIONS: CPT

## 2020-07-08 PROCEDURE — 97164 PT RE-EVAL EST PLAN CARE: CPT | Performed by: PHYSICAL THERAPIST

## 2020-07-08 PROCEDURE — 97112 NEUROMUSCULAR REEDUCATION: CPT

## 2020-07-08 NOTE — PROGRESS NOTES
Today's date: 2020  Patient name: Rios Conte  : 1960  MRN: [id number]  Referring provider: Sheila Kidd MD  Dx:   Encounter Diagnosis     ICD-10-CM    1  Aftercare following right shoulder joint replacement surgery Z47 1     Z96 611    2  Acute pain of right shoulder M25 511      Subjective:  No new c/o pain today  Objective: See treatment log below  Awa Padilla continues to be advised to follow his home exercise program as tolerated  When Awa Padilla is feeling good he follows his rehab exercises in the gym and on other days he follows his home exercise program at home as tolerated  In the future we plan on having Pedro stay at home and follow his home exercise program for four to six weeks and than assess for either more Rehab treatments or discharge from Rehab care  Assessment: Tolerated treatment well  Patient exhibited good technique with therapeutic exercises and would benefit from continued PT to increase R shoulder ROM/strength and endurance to improve mobility  Pedro's goals are to continue to improve with his rehab program and improve with functional mobility, speed, repetition and decreased c/o pain with his gym and home exercise program   Pedro's final goal for his rehab is to be discharged from Rehab care after obtaining his full functional rehab potential     Plan: Continue per plan of care  Progress treatment as tolerated  Precautions: Right Total Shoulder Replacement Surgery    All treatments below will be provided with a focus on strengthening, flexibility, ROM, postural,   endurance and any possible swelling and pain which may be present without ignoring   neural issues involving balance, coordination and proprioception which is also important   and necessary to provide full functional mobility and quality care        Daily Treatment Log  Manual       MT, ROM 30' 20' 30'     HEP/Self Care        Exercise Log      UBC 10' 10' 10'     FWC-Codmans 3#-30x HEP      FWC-Curls,Tri 3#-30x HEP      Finger Ladder        Sveta-BP,PD,Lats,Row  20# 20x 20# Lats  10# PD (limitedROM) 30x     NuStep NT       W/P-PNF,IR,ER,PU,PS,Throw-Top,Mid,Bot  5# 30x 5# 30x     W/P-OH 2x10' 10' 10'     Rotation Bar Sup/Pro  30x 30x     WallRedBallEx 3x30 Table 3x30 Priti Minesh     ME, PE 15' 15' 15'             Modalities 7/1 7/6 7/8     MH/PE 20' 20' 20'

## 2020-07-08 NOTE — LETTER
2020   PT Reevaluation [address], [place]  [address] [place] [address]    Patient: Soco Ltitle   YOB: 1960   Date of Visit: 2020     Encounter Diagnosis     ICD-10-CM    1  Aftercare following right shoulder joint replacement surgery Z47 1     Z96 611    2  Acute pain of right shoulder M25 511      Dear Dr Yvrose Montelongo: Thank you for your recent referral of Soco Little  Please review the attached evaluation summary from Pedro's recent visit  Please verify that you agree with the plan of care by signing the attached order  If you have any questions or concerns, please do not hesitate to call  I sincerely appreciate the opportunity to share in the care of one of your patients and hope to have another opportunity to work with you in the near future  Sincerely,    Mary Jane Toth, PT    Referring Provider:      I certify that I have read the below Plan of Care and certify the need for these services furnished under this plan of treatment while under my care  MD Cele Childs [address] [place] [address],[place] [address] Facsimile: [phone]    Please SIGN ABOVE and return THIS PAGE ONLY to Fax # [phone]        Today's date: 2020  Patient name: Soco Little  : 1960  MRN: [id number]  Referring provider: Flynn Carballo MD  Dx:   Encounter Diagnosis     ICD-10-CM    1  Aftercare following right shoulder joint replacement surgery Z47 1     Z96 611    2  Acute pain of right shoulder M25 511      Subjective:  No new c/o pain today  Objective: See treatment log below  Abby Olea continues to be advised to follow his home exercise program as tolerated  When Abby Olea is feeling good he follows his rehab exercises in the gym and on other days he follows his home exercise program at home as tolerated    In the future we plan on having Pedro stay at home and follow his home exercise program for four to six weeks and than assess for either more Rehab treatments or discharge from Rehab care  Assessment: Tolerated treatment well  Patient exhibited good technique with therapeutic exercises and would benefit from continued PT to increase R shoulder ROM/strength and endurance to improve mobility  Pedro's goals are to continue to improve with his rehab program and improve with functional mobility, speed, repetition and decreased c/o pain with his gym and home exercise program   Pedro's final goal for his rehab is to be discharged from Rehab care after obtaining his full functional rehab potential     Plan: Continue per plan of care  Progress treatment as tolerated  Precautions: Right Total Shoulder Replacement Surgery    All treatments below will be provided with a focus on strengthening, flexibility, ROM, postural,   endurance and any possible swelling and pain which may be present without ignoring   neural issues involving balance, coordination and proprioception which is also important   and necessary to provide full functional mobility and quality care  Daily Treatment Log  Manual       MT, ROM 30' 20' 30'     HEP/Self Care        Exercise Log      UBC 10' 10' 10'     FWC-Codmans 3#-30x HEP      FWC-Curls,Tri 3#-30x HEP      Finger Ladder        Sveta-BP,PD,Lats,Row  20# 20x 20# Lats  10# PD (limitedROM) 30x     NuStep NT       W/P-PNF,IR,ER,PU,PS,Throw-Top,Mid,Bot  5# 30x 5# 30x     W/P-OH 2x10' 10' 10'     Rotation Bar Sup/Pro  30x 30x     WallRedBallEx 3x30 Table 3x30 Wall 3x30     ME, PE 15' 15' 15'             Modalities      MH/PE 20' 20' 20'         PT Re-Evaluation   Today's date: 2020    Patient name: Jose Gonzáles  : 1960  MRN: [id number]  Referring provider: Bam Crisostomo MD  Dx:   Encounter Diagnosis     ICD-10-CM    1  Aftercare following right shoulder joint replacement surgery Z47 1     Z96 611    2  Acute pain of right shoulder M25 511      Assessment  Assessment details: Patient is slowly feeling better  Impairments: abnormal or restricted ROM, abnormal movement, activity intolerance, impaired balance, impaired physical strength, pain with function, safety issue and scapular dyskinesis  Understanding of Dx/Px/POC: excellent   Prognosis: fair    Goals  STG 2-4 weeks:   Increase UE strength by 3-6 lbs  Decrease pain by 1-2 levels on 1-10 pain scale  Increase UE PROM by 10-15 Degrees in all planes  Reduce C/O pain with lying on either side  Initiate HEP  LTG 6-8 weeks:   Increase UE strength 10-20 lbs  Demonstrate UE AROM WFL in all planes  Decrease pain to <2  Improve strength by 10-20 lbs  Return to lying on their right or left side with minimal difficulty  Improve sleep status limitations to none  D/C with HEP  Plan  Plan details: All planned modality interventions and planned therapy interventions are provided PRN    Patient would benefit from: PT eval and skilled physical therapy  Planned modality interventions: unattended electrical stimulation  Planned therapy interventions: joint mobilization, manual therapy, neuromuscular re-education, patient education, postural training, self care, strengthening, stretching, therapeutic activities, therapeutic exercise, therapeutic training, graded exercise, flexibility and coordination  Frequency: 3x week  Duration in weeks: 12  Treatment plan discussed with: patient      Subjective Evaluation    Pain  Quality: discomfort, knife-like, pulling, squeezing, sharp, tight and throbbing  Aggravating factors: lifting, standing, overhead activity and keyboarding  Progression: improved    Treatments  Previous treatment: physical therapy  Current treatment: physical therapy  Patient Goals  Patient goals for therapy: decreased pain, increased motion, return to work, return to [employer] activities, independence with ADLs/IADLs and increased strength      Date of onset:  5/22/2020    Date of Surgery:  5/22/2020    History of Present Episode: 6/8/2020  Adela Bridegroom states his right shoulder became almost non-functional   He went for a right shoulder reverse total shoulder replacement  Past Medical History:    6/8/2020  Adela Bridegroom reports diabetes type two  HTN  CABGx2    Previous Level of Functional Ability:  6/8/2020  Adela Bridegroom states his right and left shoulder worked well many years ago but his shoulder went down hill and he required a TSR Sx  Inspection / Palpation:  Shoulder:  6/8/2020  Endomorphic body type  No signs of infection  No signs of wounds  No signs of drainage  Mild signs of ecchymotic regions  Mild signs of erythremic regions  Mild to moderate signs of muscle spasm  Mild to moderate signs of muscle guarding  Moderate signs of tenderness reported to palpation  Mild to moderate signs of swelling  Positive signs of a surgery site  Current conditions appears consistent with recent acute episode  Chief Complaints:  6/8/2020  Adela Bridegroom reports moderate to severe difficulty with bending his right shoulder  Adela Bridegroom reports moderate to severe difficulty with movement of his right shoulder  Adela Bridegroom reports moderate to severe difficulty with use of his right arm  Adela Bridegroom reports moderate to severe difficulty with lifting / elevating his right arm  Adela Bridegroom reports mild difficulty with sleeping  Adela Bridegroom reports moderate to severe difficulty with his strength and endurance  Adela Bridegroom reports moderate to severe limitations with his right shoulder range of motion  Adela Bridegroom reports severe difficulty lying on his right shoulder region      SHOULDER PAIN Resting Palpation Moving Lifting Elevating   6/8/2020 Lt 0 0 0 0 0   6/8/2020 Rt  0 0-2 0-2 0-4 0-4   7/8/2020 Rt 0 0-1 0-1 0-3 0-3     SHOULDER PAIN Sleeping Throwing Pushing Pulling Twisting   6/8/2020 Lt 0 0 0 0 0   6/8/2020 Rt  0-2 NA 0-4 0-4 0-4   7/8/2020 Rt 0-1 NA 0-3 0-3 0-3     SHOULDER AROM Flexion Extension Abduction   6/8/2020 Lt 175° 60° 175°   6/8/2020 Rt 84° 23° 84°   7/8/2020 Rt 145° 48° 145°     SHOULDER AROM Hor Add Ext Rotation Int Rotation   6/8/2020 Lt 38° 45° 42°   6/8/2020 Rt 21° 42° 40°   7/8/2020 Rt 29° 44° 42°     SHLD MMT / PAIN Flexion Extension Abduction   6/8/2020 Lt 0/10  45 lbs 0/10  42 lbs 0/10  25 lbs   6/8/2020 Rt 5/10  2 lbs 5/10  3 lbs 5/10  2 lbs   7/8/2020 Rt 4/10  5 lbs 4/10  6 lbs 4/10  5 lbs     SHLD MMT / PAIN Hor Add Ext Rotation Int Rotation   6/8/2020 Lt 0/10  34 lbs 0/10  22 lbs 0/10  29 lbs   6/8/2020 Rt 5/10  3 lbs 5/10  2 lbs 5/10  3 lbs   7/8/2020 Rt 4/10  6 lbs 4/10  5 lbs 4/10  6 lbs     Shoulder Screen Rotator Cuff Apprehension Anterior  Stability Posterior  Stability   6/8/2020 Rt Negative Negative Negative Negative   6/8/2020 Lt Negative Negative Negative Negative     Shoulder Screen AC Joint SC Joint Drop Arm Adhesive Capsulitis   6/8/2020 Rt Negative Negative Negative Negative   6/8/2020 Lt Negative Negative Negative Negative     Precautions: Right Total Shoulder Replacement Surgery

## 2020-07-09 ENCOUNTER — APPOINTMENT (OUTPATIENT)
Dept: PHYSICAL THERAPY | Facility: CLINIC | Age: 60
End: 2020-07-09
Payer: COMMERCIAL

## 2020-07-10 ENCOUNTER — OFFICE VISIT (OUTPATIENT)
Dept: PHYSICAL THERAPY | Facility: CLINIC | Age: 60
End: 2020-07-10
Payer: COMMERCIAL

## 2020-07-10 DIAGNOSIS — Z47.1 AFTERCARE FOLLOWING RIGHT SHOULDER JOINT REPLACEMENT SURGERY: Primary | ICD-10-CM

## 2020-07-10 DIAGNOSIS — M25.511 ACUTE PAIN OF RIGHT SHOULDER: ICD-10-CM

## 2020-07-10 DIAGNOSIS — Z96.611 AFTERCARE FOLLOWING RIGHT SHOULDER JOINT REPLACEMENT SURGERY: Primary | ICD-10-CM

## 2020-07-10 PROCEDURE — 97112 NEUROMUSCULAR REEDUCATION: CPT | Performed by: PHYSICAL THERAPIST

## 2020-07-10 PROCEDURE — 97140 MANUAL THERAPY 1/> REGIONS: CPT

## 2020-07-10 PROCEDURE — 97110 THERAPEUTIC EXERCISES: CPT | Performed by: PHYSICAL THERAPIST

## 2020-07-10 NOTE — PROGRESS NOTES
PT Re-Evaluation   Today's date: 2020    Patient name: Isrela Knox  : 1960  MRN: [id number]  Referring provider: Miguel A Elder MD  Dx:   Encounter Diagnosis     ICD-10-CM    1  Aftercare following right shoulder joint replacement surgery Z47 1     Z96 611    2  Acute pain of right shoulder M25 511      Assessment  Assessment details: Patient is slowly feeling better  Impairments: abnormal or restricted ROM, abnormal movement, activity intolerance, impaired balance, impaired physical strength, pain with function, safety issue and scapular dyskinesis  Understanding of Dx/Px/POC: excellent   Prognosis: fair    Goals  STG 2-4 weeks:   Increase UE strength by 3-6 lbs  Decrease pain by 1-2 levels on 1-10 pain scale  Increase UE PROM by 10-15 Degrees in all planes  Reduce C/O pain with lying on either side  Initiate HEP  LTG 6-8 weeks:   Increase UE strength 10-20 lbs  Demonstrate UE AROM WFL in all planes  Decrease pain to <2  Improve strength by 10-20 lbs  Return to lying on their right or left side with minimal difficulty  Improve sleep status limitations to none  D/C with HEP  Plan  Plan details: All planned modality interventions and planned therapy interventions are provided PRN    Patient would benefit from: PT eval and skilled physical therapy  Planned modality interventions: unattended electrical stimulation  Planned therapy interventions: joint mobilization, manual therapy, neuromuscular re-education, patient education, postural training, self care, strengthening, stretching, therapeutic activities, therapeutic exercise, therapeutic training, graded exercise, flexibility and coordination  Frequency: 3x week  Duration in weeks: 12  Treatment plan discussed with: patient      Subjective Evaluation    Pain  Quality: discomfort, knife-like, pulling, squeezing, sharp, tight and throbbing  Aggravating factors: lifting, standing, overhead activity and keyboarding  Progression: improved    Treatments  Previous treatment: physical therapy  Current treatment: physical therapy  Patient Goals  Patient goals for therapy: decreased pain, increased motion, return to work, return to [employer] activities, independence with ADLs/IADLs and increased strength      Date of onset:  5/22/2020    Date of Surgery:  5/22/2020    History of Present Episode: 6/8/2020  Magalis Thurman states his right shoulder became almost non-functional   He went for a right shoulder reverse total shoulder replacement  Past Medical History:    6/8/2020  Magalis Thurman reports diabetes type two  HTN  CABGx2    Previous Level of Functional Ability:  6/8/2020  Magalis Thurman states his right and left shoulder worked well many years ago but his shoulder went down hill and he required a TSR Sx  Inspection / Palpation:  Shoulder:  6/8/2020  Endomorphic body type  No signs of infection  No signs of wounds  No signs of drainage  Mild signs of ecchymotic regions  Mild signs of erythremic regions  Mild to moderate signs of muscle spasm  Mild to moderate signs of muscle guarding  Moderate signs of tenderness reported to palpation  Mild to moderate signs of swelling  Positive signs of a surgery site  Current conditions appears consistent with recent acute episode  Chief Complaints:  6/8/2020  Magalis Meuse reports moderate to severe difficulty with bending his right shoulder  Magalis Meuse reports moderate to severe difficulty with movement of his right shoulder  Magalis Meuse reports moderate to severe difficulty with use of his right arm  Magalis Meuse reports moderate to severe difficulty with lifting / elevating his right arm  Magalis Meuse reports mild difficulty with sleeping  Magalis Meuse reports moderate to severe difficulty with his strength and endurance  Magalis Meuse reports moderate to severe limitations with his right shoulder range of motion    Magalis Meuse reports severe difficulty lying on his right shoulder region      SHOULDER PAIN Resting Palpation Moving Lifting Elevating   6/8/2020 Lt 0 0 0 0 0   6/8/2020 Rt  0 0-2 0-2 0-4 0-4   7/8/2020 Rt 0 0-1 0-1 0-3 0-3     SHOULDER PAIN Sleeping Throwing Pushing Pulling Twisting   6/8/2020 Lt 0 0 0 0 0   6/8/2020 Rt  0-2 NA 0-4 0-4 0-4   7/8/2020 Rt 0-1 NA 0-3 0-3 0-3     SHOULDER AROM Flexion Extension Abduction   6/8/2020 Lt 175° 60° 175°   6/8/2020 Rt 84° 23° 84°   7/8/2020 Rt 145° 48° 145°     SHOULDER AROM Hor Add Ext Rotation Int Rotation   6/8/2020 Lt 38° 45° 42°   6/8/2020 Rt 21° 42° 40°   7/8/2020 Rt 29° 44° 42°     SHLD MMT / PAIN Flexion Extension Abduction   6/8/2020 Lt 0/10  45 lbs 0/10  42 lbs 0/10  25 lbs   6/8/2020 Rt 5/10  2 lbs 5/10  3 lbs 5/10  2 lbs   7/8/2020 Rt 4/10  5 lbs 4/10  6 lbs 4/10  5 lbs     SHLD MMT / PAIN Hor Add Ext Rotation Int Rotation   6/8/2020 Lt 0/10  34 lbs 0/10  22 lbs 0/10  29 lbs   6/8/2020 Rt 5/10  3 lbs 5/10  2 lbs 5/10  3 lbs   7/8/2020 Rt 4/10  6 lbs 4/10  5 lbs 4/10  6 lbs     Shoulder Screen Rotator Cuff Apprehension Anterior  Stability Posterior  Stability   6/8/2020 Rt Negative Negative Negative Negative   6/8/2020 Lt Negative Negative Negative Negative     Shoulder Screen AC Joint SC Joint Drop Arm Adhesive Capsulitis   6/8/2020 Rt Negative Negative Negative Negative   6/8/2020 Lt Negative Negative Negative Negative     Precautions: Right Total Shoulder Replacement Surgery

## 2020-07-10 NOTE — PROGRESS NOTES
Today's date: 7/10/2020  Patient name: Francia Feldman  : 1960  MRN: [id number]  Referring provider: Agustín Rachel MD  Dx:   Encounter Diagnosis     ICD-10-CM    1  Aftercare following right shoulder joint replacement surgery Z47 1     Z96 611    2  Acute pain of right shoulder M25 511      Subjective:  Gayathri Walters states his shoulder is feeling better  Objective: See treatment log below  Gayathri Walters continues to be advised to follow his home exercise program as tolerated  When Gayathri Walters is feeling good he follows his rehab exercises in the gym and on other days he follows his home exercise program at home as tolerated  In the future we plan on having Pedro stay at home and follow his home exercise program for four to six weeks and than assess for either more Rehab treatments or discharge from Rehab care  Assessment: Tolerated treatment well  Patient exhibited good technique with therapeutic exercises and would benefit from continued PT  Pedro's goals are to continue to improve with his rehab program and improve with functional mobility, speed, repetition and decreased c/o pain with his gym and home exercise program   Pedro's final goal for his rehab is to be discharged from Rehab care after obtaining his full functional rehab potential     Plan: Continue per plan of care  Progress treatment as tolerated  Precautions: Right Total Shoulder Replacement Surgery    All treatments below will be provided with a focus on strengthening, flexibility, ROM, postural,   endurance and any possible swelling and pain which may be present without ignoring   neural issues involving balance, coordination and proprioception which is also important   and necessary to provide full functional mobility and quality care        Daily Treatment Log  Manual  7/1 7/6 7/8 7/10    MT, ROM 30' 20' 30' 30'    HEP/Self Care        Exercise Log 7/1 7/6 7/8 7/10    [facility] 10' 10' 10' 10'    [facility]      FWC-Curls,Tri 3#-30x HEP      Finger Ladder        Sveta-BP,PD,Lats,Row  20# 20x 20# Lats  10# PD (limitedROM) 30x 20#-30x    NuStep NT       W/P-PNF,IR,ER,PU,PS,Throw-Top,Mid,Bot  5# 30x 5# 30x 5#-30x    W/P-OH 2x10' 10' 10' 10'    Rotation Bar Sup/Pro  30x 30x 30x    WallRedBallEx 3x30 Table 3x30 Wall 3x30 3x30x    ME, PE 15' 15' 15' 15'            Modalities 7/1 7/6 7/8 7/10    MH/PE 20' 20' 20' 20'

## 2020-07-13 ENCOUNTER — OFFICE VISIT (OUTPATIENT)
Dept: PHYSICAL THERAPY | Facility: CLINIC | Age: 60
End: 2020-07-13
Payer: COMMERCIAL

## 2020-07-13 DIAGNOSIS — M25.511 ACUTE PAIN OF RIGHT SHOULDER: ICD-10-CM

## 2020-07-13 DIAGNOSIS — Z47.1 AFTERCARE FOLLOWING RIGHT SHOULDER JOINT REPLACEMENT SURGERY: Primary | ICD-10-CM

## 2020-07-13 DIAGNOSIS — Z96.611 AFTERCARE FOLLOWING RIGHT SHOULDER JOINT REPLACEMENT SURGERY: Primary | ICD-10-CM

## 2020-07-13 PROCEDURE — 97112 NEUROMUSCULAR REEDUCATION: CPT | Performed by: PHYSICAL THERAPIST

## 2020-07-13 PROCEDURE — 97110 THERAPEUTIC EXERCISES: CPT | Performed by: PHYSICAL THERAPIST

## 2020-07-13 PROCEDURE — 97140 MANUAL THERAPY 1/> REGIONS: CPT | Performed by: PHYSICAL THERAPIST

## 2020-07-13 NOTE — PROGRESS NOTES
Today's date: 2020  Patient name: Margarita Ruffin  : 1960  MRN: [id number]  Referring provider: Rebecca Cuba MD  Dx:   Encounter Diagnosis     ICD-10-CM    1  Aftercare following right shoulder joint replacement surgery Z47 1     Z96 611    2  Acute pain of right shoulder M25 511      Subjective:  Carol Leonard states his shoulder is slowly feeling better  Objective: See treatment log below  Carol Leonard continues to be advised to follow his home exercise program as tolerated  When Carol Leonard is feeling good he follows his rehab exercises in the gym and on other days he follows his home exercise program at home as tolerated  In the future we plan on having Pedro stay at home and follow his home exercise program for four to six weeks and than assess for either more Rehab treatments or discharge from Rehab care  Assessment: Tolerated treatment well  Patient exhibited good technique with therapeutic exercises and would benefit from continued PT  Pedro's goals are to continue to improve with his rehab program and improve with functional mobility, speed, repetition and decreased c/o pain with his gym and home exercise program   Pedro's final goal for his rehab is to be discharged from Rehab care after obtaining his full functional rehab potential     Plan: Continue per plan of care  Progress treatment as tolerated  Precautions: Right Total Shoulder Replacement Surgery    All treatments below will be provided with a focus on strengthening, flexibility, ROM, postural,   endurance and any possible swelling and pain which may be present without ignoring   neural issues involving balance, coordination and proprioception which is also important   and necessary to provide full functional mobility and quality care        Daily Treatment Log  Manual  7/1 7/6 7/8 7/10 7/13   MT, ROM 30' 20' 30' 30' 30'   HEP/Self Care        Exercise Log 7/1 7/6 7/8 7/10 7/13   UBC 10' 10' 10' 10' 10'   FWC-Codmans 3#-30x HEP      FWC-Curls,Tri 3#-30x HEP      Finger Ladder        Sveta-BP,PD,Lats,Row  20# 20x 20# Lats  10# PD (limitedROM) 30x 20#-30x 20#-30x   NuStep NT       W/P-PNF,IR,ER,PU,PS,Throw-Top,Mid,Bot  5# 30x 5# 30x 5#-30x 5#-30x   W/P-OH 2x10' 10' 10' 10' 10'   Rotation Bar Sup/Pro  30x 30x 30x 30x   WallRedBallEx 3x30 Table 3x30 Wall 3x30 3x30x 3x30x   ME, PE 15' 15' 15' 15' 15'           Modalities 7/1 7/6 7/8 7/10 7/13   MH/PE 20' 20' 20' 20' 20'

## 2020-07-14 ENCOUNTER — TRANSCRIBE ORDERS (OUTPATIENT)
Dept: PHYSICAL THERAPY | Facility: CLINIC | Age: 60
End: 2020-07-14

## 2020-07-14 DIAGNOSIS — M25.511 ACUTE PAIN OF RIGHT SHOULDER: ICD-10-CM

## 2020-07-14 DIAGNOSIS — Z47.1 AFTERCARE FOLLOWING RIGHT SHOULDER JOINT REPLACEMENT SURGERY: Primary | ICD-10-CM

## 2020-07-14 DIAGNOSIS — Z96.611 AFTERCARE FOLLOWING RIGHT SHOULDER JOINT REPLACEMENT SURGERY: Primary | ICD-10-CM

## 2020-07-15 ENCOUNTER — OFFICE VISIT (OUTPATIENT)
Dept: PHYSICAL THERAPY | Facility: CLINIC | Age: 60
End: 2020-07-15
Payer: COMMERCIAL

## 2020-07-15 DIAGNOSIS — Z96.611 AFTERCARE FOLLOWING RIGHT SHOULDER JOINT REPLACEMENT SURGERY: Primary | ICD-10-CM

## 2020-07-15 DIAGNOSIS — M25.511 ACUTE PAIN OF RIGHT SHOULDER: ICD-10-CM

## 2020-07-15 DIAGNOSIS — Z47.1 AFTERCARE FOLLOWING RIGHT SHOULDER JOINT REPLACEMENT SURGERY: Primary | ICD-10-CM

## 2020-07-15 PROCEDURE — 97140 MANUAL THERAPY 1/> REGIONS: CPT

## 2020-07-15 PROCEDURE — 97112 NEUROMUSCULAR REEDUCATION: CPT

## 2020-07-15 NOTE — PROGRESS NOTES
Today's date: 7/15/2020  Patient name: Francia Feldman  : 1960  MRN: [id number]  Referring provider: Agustín Rachel MD  Dx:   Encounter Diagnosis     ICD-10-CM    1  Aftercare following right shoulder joint replacement surgery Z47 1     Z96 611    2  Acute pain of right shoulder M25 511      Subjective:  No new c/o pain today  Objective: See treatment log below  Gayathri Walters continues to be advised to follow his home exercise program as tolerated  When Gayathri Walters is feeling good he follows his rehab exercises in the gym and on other days he follows his home exercise program at home as tolerated  In the future we plan on having Pedro stay at home and follow his home exercise program for four to six weeks and than assess for either more Rehab treatments or discharge from Rehab care  Assessment: Tolerated treatment well  Patient exhibited good technique with therapeutic exercises and would benefit from continued PT to increase R shoulder ROM/strength and endurance to improve mobility  Pedro's goals are to continue to improve with his rehab program and improve with functional mobility, speed, repetition and decreased c/o pain with his gym and home exercise program   Pedro's final goal for his rehab is to be discharged from Rehab care after obtaining his full functional rehab potential     Plan: Continue per plan of care  Progress treatment as tolerated  Precautions: Right Total Shoulder Replacement Surgery    All treatments below will be provided with a focus on strengthening, flexibility, ROM, postural,   endurance and any possible swelling and pain which may be present without ignoring   neural issues involving balance, coordination and proprioception which is also important   and necessary to provide full functional mobility and quality care        Daily Treatment Log  Manual  7/15    7/13   MT, ROM 20'    30'   HEP/Self Care        Exercise Log 7/15    7/13   UBC 10' 10'   FWC-Codmans        FW-Curls,Tri        Finger Ladder        Sveta-BP,PD,Lats,Row 20# 30x    20#-30x   NuStep        W/P-PNF,IR,ER,PU,PS,Throw-Top,Mid,Bot 7 5# 30x    5#-30x   W/P-OH 10'    10'   Rotation Bar Sup/Pro 30x    30x   WallRedBallEx 3x30    3x30x   ME, PE 15'    15'           Modalities 7/15    7/13   MH/PE 20'    20'

## 2020-07-17 ENCOUNTER — OFFICE VISIT (OUTPATIENT)
Dept: PHYSICAL THERAPY | Facility: CLINIC | Age: 60
End: 2020-07-17
Payer: COMMERCIAL

## 2020-07-17 DIAGNOSIS — Z96.611 AFTERCARE FOLLOWING RIGHT SHOULDER JOINT REPLACEMENT SURGERY: Primary | ICD-10-CM

## 2020-07-17 DIAGNOSIS — M25.511 ACUTE PAIN OF RIGHT SHOULDER: ICD-10-CM

## 2020-07-17 DIAGNOSIS — Z47.1 AFTERCARE FOLLOWING RIGHT SHOULDER JOINT REPLACEMENT SURGERY: Primary | ICD-10-CM

## 2020-07-17 PROCEDURE — 97112 NEUROMUSCULAR REEDUCATION: CPT

## 2020-07-17 PROCEDURE — 97110 THERAPEUTIC EXERCISES: CPT

## 2020-07-17 PROCEDURE — 97140 MANUAL THERAPY 1/> REGIONS: CPT

## 2020-07-17 NOTE — PROGRESS NOTES
Today's date: 2020  Patient name: Ann Babinski  : 1960  MRN: [id number]  Referring provider: Jess Salinas MD  Dx:   Encounter Diagnosis     ICD-10-CM    1  Aftercare following right shoulder joint replacement surgery Z47 1     Z96 611    2  Acute pain of right shoulder M25 511      Subjective:  No new c/o pain today  Objective: See treatment log below  Dayanna Wheatley continues to be advised to follow his home exercise program as tolerated  When Dayanna Wheatley is feeling good he follows his rehab exercises in the gym and on other days he follows his home exercise program at home as tolerated  In the future we plan on having Pedro stay at home and follow his home exercise program for four to six weeks and than assess for either more Rehab treatments or discharge from Rehab care  Assessment: Tolerated treatment well  Patient exhibited good technique with therapeutic exercises and would benefit from continued PT to increase R shoulder ROM/strength and endurance to improve mobility  Pedro's goals are to continue to improve with his rehab program and improve with functional mobility, speed, repetition and decreased c/o pain with his gym and home exercise program   Pedro's final goal for his rehab is to be discharged from Rehab care after obtaining his full functional rehab potential     Plan: Continue per plan of care  Progress treatment as tolerated  Precautions: Right Total Shoulder Replacement Surgery    All treatments below will be provided with a focus on strengthening, flexibility, ROM, postural,   endurance and any possible swelling and pain which may be present without ignoring   neural issues involving balance, coordination and proprioception which is also important   and necessary to provide full functional mobility and quality care        Daily Treatment Log  Manual  7/15 7/17      MT, ROM 20' 20'      HEP/Self Care        Exercise Log 7/15 7/17      UBC 10' 10' FWC-Codmans        FW-Curls,Tri        Finger Ladder  30x      Sveta-BP,PD,Lats,Row 20# 30x 20#LatsPD 30x      NuStep        W/P-PNF,IR,ER,PU,PS,Throw-Top,Mid,Bot 7 5# 30x 7 5# 30x      W/P-OH 10' 10'      Rotation Bar Sup/Pro 30x 30x      Mia Kaur, PE 15' 15'              Modalities 7/15 7/17      /PE 20' 25'

## 2020-07-20 ENCOUNTER — OFFICE VISIT (OUTPATIENT)
Dept: PHYSICAL THERAPY | Facility: CLINIC | Age: 60
End: 2020-07-20
Payer: COMMERCIAL

## 2020-07-20 DIAGNOSIS — M25.511 ACUTE PAIN OF RIGHT SHOULDER: ICD-10-CM

## 2020-07-20 DIAGNOSIS — Z96.611 AFTERCARE FOLLOWING RIGHT SHOULDER JOINT REPLACEMENT SURGERY: Primary | ICD-10-CM

## 2020-07-20 DIAGNOSIS — Z47.1 AFTERCARE FOLLOWING RIGHT SHOULDER JOINT REPLACEMENT SURGERY: Primary | ICD-10-CM

## 2020-07-20 PROCEDURE — 97112 NEUROMUSCULAR REEDUCATION: CPT

## 2020-07-20 PROCEDURE — 97140 MANUAL THERAPY 1/> REGIONS: CPT

## 2020-07-20 NOTE — PROGRESS NOTES
Today's date: 2020  Patient name: Paul Fatima  : 1960  MRN: [id number]  Referring provider: Sayra Corral MD  Dx:   Encounter Diagnosis     ICD-10-CM    1  Aftercare following right shoulder joint replacement surgery Z47 1     Z96 611    2  Acute pain of right shoulder M25 511      Subjective:  No new c/o pain today  Objective: See treatment log below  Carolina Ríos continues to be advised to follow his home exercise program as tolerated  When Carolina Ríos is feeling good he follows his rehab exercises in the gym and on other days he follows his home exercise program at home as tolerated  In the future we plan on having Pedro stay at home and follow his home exercise program for four to six weeks and than assess for either more Rehab treatments or discharge from Rehab care  Assessment: Tolerated treatment well  Patient exhibited good technique with therapeutic exercises and would benefit from continued PT to increase R shoulder ROM/strength and endurance to improve mobility  Pedro's goals are to continue to improve with his rehab program and improve with functional mobility, speed, repetition and decreased c/o pain with his gym and home exercise program   Pedro's final goal for his rehab is to be discharged from Rehab care after obtaining his full functional rehab potential     Plan: Continue per plan of care  Progress treatment as tolerated  Precautions: Right Total Shoulder Replacement Surgery    All treatments below will be provided with a focus on strengthening, flexibility, ROM, postural,   endurance and any possible swelling and pain which may be present without ignoring   neural issues involving balance, coordination and proprioception which is also important   and necessary to provide full functional mobility and quality care        Daily Treatment Log  Manual  7/15 7/17 7/20     MT, ROM 20' 20' 30'     HEP/Self Care        Exercise Log 7/15 7/17 7/20 UBC 10' 10' 10'     FWC-Codmans        FWC-Curls,Tri        Finger Ladder  30x NT     Sveta-BP,PD,Lats,Row 20# 30x 20#LatsPD 30x 20#LatsPDRow  10#BP  30x     NuStep        W/P-PNF,IR,ER,PU,PS,Throw-Top,Mid,Bot 7 5# 30x 7 5# 30x 7  5#TopMid5#Bot  30x     W/P-OH 10' 10' 2x10'     Rotation Bar Sup/Pro 30x 30x 30x     WallRedBallEx 3x30 3x30 3x30     ME, PE 15' 15' 15'             Modalities 7/15 7/17 7/20     MH/PE 20' 25' 20'

## 2020-07-22 ENCOUNTER — OFFICE VISIT (OUTPATIENT)
Dept: PHYSICAL THERAPY | Facility: CLINIC | Age: 60
End: 2020-07-22
Payer: COMMERCIAL

## 2020-07-22 DIAGNOSIS — Z47.1 AFTERCARE FOLLOWING RIGHT SHOULDER JOINT REPLACEMENT SURGERY: Primary | ICD-10-CM

## 2020-07-22 DIAGNOSIS — M25.511 ACUTE PAIN OF RIGHT SHOULDER: ICD-10-CM

## 2020-07-22 DIAGNOSIS — Z96.611 AFTERCARE FOLLOWING RIGHT SHOULDER JOINT REPLACEMENT SURGERY: Primary | ICD-10-CM

## 2020-07-22 PROCEDURE — 97112 NEUROMUSCULAR REEDUCATION: CPT

## 2020-07-22 PROCEDURE — 97140 MANUAL THERAPY 1/> REGIONS: CPT

## 2020-07-22 NOTE — PROGRESS NOTES
Today's date: 2020  Patient name: Taniya Taveras  : 1960  MRN: [id number]  Referring provider: Lee Wang MD  Dx:   Encounter Diagnosis     ICD-10-CM    1  Aftercare following right shoulder joint replacement surgery Z47 1     Z96 611    2  Acute pain of right shoulder M25 511      Subjective:  No new c/o pain today  Objective: See treatment log below  Eber Bhatia continues to be advised to follow his home exercise program as tolerated  When Eber Bhatia is feeling good he follows his rehab exercises in the gym and on other days he follows his home exercise program at home as tolerated  In the future we plan on having Pedro stay at home and follow his home exercise program for four to six weeks and than assess for either more Rehab treatments or discharge from Rehab care  Assessment: Tolerated treatment well  Patient exhibited good technique with therapeutic exercises and would benefit from continued PT to increase R shoulder ROM/strength and endurance to improve mobility  Pedro's goals are to continue to improve with his rehab program and improve with functional mobility, speed, repetition and decreased c/o pain with his gym and home exercise program   Pedro's final goal for his rehab is to be discharged from Rehab care after obtaining his full functional rehab potential     Plan: Continue per plan of care  Progress treatment as tolerated  Precautions: Right Total Shoulder Replacement Surgery    All treatments below will be provided with a focus on strengthening, flexibility, ROM, postural,   endurance and any possible swelling and pain which may be present without ignoring   neural issues involving balance, coordination and proprioception which is also important   and necessary to provide full functional mobility and quality care        Daily Treatment Log  Manual  7/15 7/17 7/20 7/22    MT, ROM 20' 20' 30' 30'    HEP/Self Care        Exercise Log 7/15 7/17 7/20 7/22    UBC 10' 10' 10' 10'    FW-Codmans        FWC-Curls,Tri        Finger Ladder  30x NT NT    Sveta-BP,PD,Lats,Row 20# 30x 20#LatsPD 30x 20#LatsPDRow  10#BP  30x NT    NuStep        W/P-PNF,IR,ER,PU,PS,Throw-Top,Mid,Bot 7 5# 30x 7 5# 30x 7  5#TopMid5#Bot  30x 7 5# All  30x    W/P-OH 10' 10' 2x10' 2x10'    Rotation Bar Sup/Pro 30x 30x 30x 30x    WallRedBallEx 3x30 3x30 3x30 3x30    ME, PE 15' 15' 15' 15'            Modalities 7/15 7/17 7/20 7/22    MH/PE 20' 25' 20' 15'

## 2020-07-24 ENCOUNTER — OFFICE VISIT (OUTPATIENT)
Dept: PHYSICAL THERAPY | Facility: CLINIC | Age: 60
End: 2020-07-24
Payer: COMMERCIAL

## 2020-07-24 DIAGNOSIS — M25.511 ACUTE PAIN OF RIGHT SHOULDER: ICD-10-CM

## 2020-07-24 DIAGNOSIS — Z96.611 AFTERCARE FOLLOWING RIGHT SHOULDER JOINT REPLACEMENT SURGERY: Primary | ICD-10-CM

## 2020-07-24 DIAGNOSIS — Z47.1 AFTERCARE FOLLOWING RIGHT SHOULDER JOINT REPLACEMENT SURGERY: Primary | ICD-10-CM

## 2020-07-24 PROCEDURE — 97112 NEUROMUSCULAR REEDUCATION: CPT

## 2020-07-24 PROCEDURE — 97110 THERAPEUTIC EXERCISES: CPT | Performed by: PHYSICAL THERAPIST

## 2020-07-24 PROCEDURE — 97140 MANUAL THERAPY 1/> REGIONS: CPT

## 2020-07-24 NOTE — PROGRESS NOTES
Today's date: 2020  Patient name: Joon Wang  : 1960  MRN: [id number]  Referring provider: Deborah Dennis MD  Dx:   Encounter Diagnosis     ICD-10-CM    1  Aftercare following right shoulder joint replacement surgery Z47 1     Z96 611    2  Acute pain of right shoulder M25 511      Subjective:  No new c/o pain today  Objective: See treatment log below  [place] continues to be advised to follow his home exercise program as tolerated  When [place] is feeling good he follows his rehab exercises in the gym and on other days he follows his home exercise program at home as tolerated  In the future we plan on having Pedro stay at home and follow his home exercise program for four to six weeks and than assess for either more Rehab treatments or discharge from Rehab care  Assessment: Tolerated treatment well  Patient exhibited good technique with therapeutic exercises and would benefit from continued PT to increase R shoulder ROM/strength and endurance to improve mobility  Zhoukelton's goals are to continue to improve with his rehab program and improve with functional mobility, speed, repetition and decreased c/o pain with his gym and home exercise program   Cezarpavel's final goal for his rehab is to be discharged from Rehab care after obtaining his full functional rehab potential     Plan: Continue per plan of care  Progress treatment as tolerated  Precautions: Right Total Shoulder Replacement Surgery    All treatments below will be provided with a focus on strengthening, flexibility, ROM, postural,   endurance and any possible swelling and pain which may be present without ignoring   neural issues involving balance, coordination and proprioception which is also important   and necessary to provide full functional mobility and quality care        Daily Treatment Log  Manual  7/15 7/17 7/20 7/22 7/24   MT, ROM 20' 20' 30' 30' 30'   HEP/Self Care        Exercise Log 7/15 7/17 7/20 7/22 7/24   UBC 10' 10' 10' 10' 10'   FW-Codmans        FW-Curls,Tri        Finger Ladder  30x NT NT 3x ea   Sveta-BP,PD,Lats,Row 20# 30x 20#LatsPD 30x 20#LatsPDRow  10#BP  30x NT 25#LatsPD  30x   NuStep        W/P-PNF,IR,ER,PU,PS,Throw-Top,Mid,Bot 7 5# 30x 7 5# 30x 7  5#TopMid5#Bot  30x 7 5# All  30x 7 5# 30x   W/P-OH 10' 10' 2x10' 2x10' 2x10'   Rotation Bar Sup/Pro 30x 30x 30x 30x 30x   WallRedBallEx 3x30 3x30 3x30 3x30 3x30   ME, PE 15' 15' 15' 15' 15'           Modalities 7/15 7/17 7/20 7/22 7/24   MH/PE 20' 25' 20' 15' 20'

## 2020-07-27 ENCOUNTER — OFFICE VISIT (OUTPATIENT)
Dept: PHYSICAL THERAPY | Facility: CLINIC | Age: 60
End: 2020-07-27
Payer: COMMERCIAL

## 2020-07-27 DIAGNOSIS — Z47.1 AFTERCARE FOLLOWING RIGHT SHOULDER JOINT REPLACEMENT SURGERY: Primary | ICD-10-CM

## 2020-07-27 DIAGNOSIS — Z96.611 AFTERCARE FOLLOWING RIGHT SHOULDER JOINT REPLACEMENT SURGERY: Primary | ICD-10-CM

## 2020-07-27 DIAGNOSIS — M25.511 ACUTE PAIN OF RIGHT SHOULDER: ICD-10-CM

## 2020-07-27 PROCEDURE — 97112 NEUROMUSCULAR REEDUCATION: CPT

## 2020-07-27 PROCEDURE — 97140 MANUAL THERAPY 1/> REGIONS: CPT

## 2020-07-27 NOTE — PROGRESS NOTES
Today's date: 2020  Patient name: Dee Dee Monahan  : 1960  MRN: [id number]  Referring provider: Siddharth Julien MD  Dx:   Encounter Diagnosis     ICD-10-CM    1  Aftercare following right shoulder joint replacement surgery Z47 1     Z96 611    2  Acute pain of right shoulder M25 511      Subjective:  No new c/o pain today  Objective: See treatment log below  Breanna Vargas continues to be advised to follow his home exercise program as tolerated  When Breanna Vargas is feeling good he follows his rehab exercises in the gym and on other days he follows his home exercise program at home as tolerated  In the future we plan on having Pedro stay at home and follow his home exercise program for four to six weeks and than assess for either more Rehab treatments or discharge from Rehab care  Assessment: Tolerated treatment well  Patient exhibited good technique with therapeutic exercises and would benefit from continued PT to increase R shoulder ROM/strength and endurance to improve mobility  Pedro's goals are to continue to improve with his rehab program and improve with functional mobility, speed, repetition and decreased c/o pain with his gym and home exercise program   Pedro's final goal for his rehab is to be discharged from Rehab care after obtaining his full functional rehab potential     Plan: Continue per plan of care  Progress treatment as tolerated  Precautions: Right Total Shoulder Replacement Surgery    All treatments below will be provided with a focus on strengthening, flexibility, ROM, postural,   endurance and any possible swelling and pain which may be present without ignoring   neural issues involving balance, coordination and proprioception which is also important   and necessary to provide full functional mobility and quality care        Daily Treatment Log  Manual     MT, ROM 30'    30'   HEP/Self Care        Exercise Log    UBC 10' 10'   FW-Codmans        FW-Curls,Tri        Finger Ladder 3x ea    3x ea   Sveta-BP,PD,Lats,Row 25#LatsPDRow  10#BP 30x    25#LatsPD  30x   NuStep        W/P-PNF,IR,ER,PU,PS,Throw-Top,Mid,Bot 7 5# 30x    7 5# 30x   W/P-OH 2x10'    2x10'   Rotation Bar Sup/Pro 30x    30x   WallRedBallEx 3x30    3x30   ME, PE 15'    15'           Modalities 7/27 7/24   MH/PE 20'    20'

## 2020-07-29 ENCOUNTER — OFFICE VISIT (OUTPATIENT)
Dept: PHYSICAL THERAPY | Facility: CLINIC | Age: 60
End: 2020-07-29
Payer: COMMERCIAL

## 2020-07-29 DIAGNOSIS — Z47.1 AFTERCARE FOLLOWING RIGHT SHOULDER JOINT REPLACEMENT SURGERY: Primary | ICD-10-CM

## 2020-07-29 DIAGNOSIS — Z96.611 AFTERCARE FOLLOWING RIGHT SHOULDER JOINT REPLACEMENT SURGERY: Primary | ICD-10-CM

## 2020-07-29 DIAGNOSIS — M25.511 ACUTE PAIN OF RIGHT SHOULDER: ICD-10-CM

## 2020-07-29 PROCEDURE — 97110 THERAPEUTIC EXERCISES: CPT | Performed by: PHYSICAL THERAPIST

## 2020-07-29 PROCEDURE — 97140 MANUAL THERAPY 1/> REGIONS: CPT | Performed by: PHYSICAL THERAPIST

## 2020-07-29 PROCEDURE — 97112 NEUROMUSCULAR REEDUCATION: CPT | Performed by: PHYSICAL THERAPIST

## 2020-07-29 NOTE — PROGRESS NOTES
Today's date: 2020  Patient name: Jililan Wang  : 1960  MRN: [id number]  Referring provider: Sury Germain MD  Dx:   Encounter Diagnosis     ICD-10-CM    1  Aftercare following right shoulder joint replacement surgery Z47 1     Z96 611    2  Acute pain of right shoulder M25 511      Subjective:  Rich Saini states his right shoulder is slowly feeling better  His strength and movement is progressing well  His power is the main limitation  Objective: See treatment log below  Rich Saini continues to be advised to follow his home exercise program as tolerated  When Rich Saini is feeling good he follows his rehab exercises in the gym and on other days he follows his home exercise program at home as tolerated  In the future we plan on having [place] stay at home and follow his home exercise program for four to six weeks and than assess for either more Rehab treatments or discharge from Rehab care  Assessment: Tolerated treatment well  Patient exhibited good technique with therapeutic exercises and would benefit from continued PT  Pedro's goals are to continue to improve with his rehab program and improve with functional mobility, speed, repetition and decreased c/o pain with his gym and home exercise program   Pedro's final goal for his rehab is to be discharged from Rehab care after obtaining his full functional rehab potential     Plan: Continue per plan of care  Progress treatment as tolerated  Precautions: Right Total Shoulder Replacement Surgery    All treatments below will be provided with a focus on strengthening, flexibility, ROM, postural,   endurance and any possible swelling and pain which may be present without ignoring   neural issues involving balance, coordination and proprioception which is also important   and necessary to provide full functional mobility and quality care        Daily Treatment Log  Manual        MT, ROM 30' 30'      HEP/Self Care        Exercise Log 7/27 7/29      UBC 10' 10'      FW-Codmans        FW-Curls,Tri        Finger Ladder 3x ea 3x      Sveta-BP,PD,Lats,Row 25#LatsPDRow  10#BP 30x 25#-30x      NuStep        W/P-PNF,IR,ER,PU,PS,Throw-Top,Mid,Bot 7 5# 30x 7 5#-30x      W/P-OH 2x10' 2x10'      Rotation Bar Sup/Pro 30x 30x      WallRedBallEx 3x30 3x30x      ME, PE 15' 15'              Modalities 7/27 7/29      MH/PE 20' 20'

## 2020-07-31 ENCOUNTER — APPOINTMENT (OUTPATIENT)
Dept: PHYSICAL THERAPY | Facility: CLINIC | Age: 60
End: 2020-07-31
Payer: COMMERCIAL

## 2020-08-03 ENCOUNTER — OFFICE VISIT (OUTPATIENT)
Dept: PHYSICAL THERAPY | Facility: CLINIC | Age: 60
End: 2020-08-03
Payer: COMMERCIAL

## 2020-08-03 DIAGNOSIS — Z47.1 AFTERCARE FOLLOWING RIGHT SHOULDER JOINT REPLACEMENT SURGERY: Primary | ICD-10-CM

## 2020-08-03 DIAGNOSIS — M25.511 ACUTE PAIN OF RIGHT SHOULDER: ICD-10-CM

## 2020-08-03 DIAGNOSIS — Z96.611 AFTERCARE FOLLOWING RIGHT SHOULDER JOINT REPLACEMENT SURGERY: Primary | ICD-10-CM

## 2020-08-03 PROCEDURE — 97140 MANUAL THERAPY 1/> REGIONS: CPT

## 2020-08-03 PROCEDURE — 97112 NEUROMUSCULAR REEDUCATION: CPT

## 2020-08-03 NOTE — PROGRESS NOTES
Today's date: 8/3/2020  Patient name: Lucía Harrison  : 1960  MRN: [id number]  Referring provider: Abisai Saldaña MD  Dx:   Encounter Diagnosis     ICD-10-CM    1  Aftercare following right shoulder joint replacement surgery  Z47 1     Z96 611    2  Acute pain of right shoulder  M25 511      Subjective:  No new c/o pain today  Objective: See treatment log below  Quiana Christianson continues to be advised to follow his home exercise program as tolerated  When Quiana Christianson is feeling good he follows his rehab exercises in the gym and on other days he follows his home exercise program at home as tolerated  In the future we plan on having Pedro stay at home and follow his home exercise program for four to six weeks and than assess for either more Rehab treatments or discharge from Rehab care  Assessment: Tolerated treatment well  Patient exhibited good technique with therapeutic exercises and would benefit from continued PT to increase R shoulder ROM/strength and endurance to improve mobility  Pedro's goals are to continue to improve with his rehab program and improve with functional mobility, speed, repetition and decreased c/o pain with his gym and home exercise program   Pedro's final goal for his rehab is to be discharged from Rehab care after obtaining his full functional rehab potential     Plan: Continue per plan of care  Progress treatment as tolerated  Precautions: Right Total Shoulder Replacement Surgery    All treatments below will be provided with a focus on strengthening, flexibility, ROM, postural,   endurance and any possible swelling and pain which may be present without ignoring   neural issues involving balance, coordination and proprioception which is also important   and necessary to provide full functional mobility and quality care        Daily Treatment Log  Manual  7/27 7/29 8/3     MT, ROM 30' 30' 30'     HEP/Self Care        Exercise Log 7/27 7/29 8/3 UBC 10' 10' 10'     FWC-Codmans        FWC-Curls,Tri        Finger Ladder 3x ea 3x NT     Sveta-BP,PD,Lats,Row 25#LatsPDRow  10#BP 30x 25#-30x 25#LatsPD  15#BPRow  30x     NuStep        W/P-PNF,IR,ER,PU,PS,Throw-Top,Mid,Bot 7 5# 30x 7 5#-30x 7 5# 30x     W/P-OH 2x10' 2x10' 2x10'     Rotation Bar Sup/Pro 30x 30x 30x     WallRedBallEx 3x30 3x30x 3x30     ME, PE 15' 15' 15'             Modalities 7/27 7/29 8/3     MH/PE 20' 20' 20'

## 2020-08-05 ENCOUNTER — OFFICE VISIT (OUTPATIENT)
Dept: PHYSICAL THERAPY | Facility: CLINIC | Age: 60
End: 2020-08-05
Payer: COMMERCIAL

## 2020-08-05 DIAGNOSIS — M25.511 ACUTE PAIN OF RIGHT SHOULDER: ICD-10-CM

## 2020-08-05 DIAGNOSIS — Z47.1 AFTERCARE FOLLOWING RIGHT SHOULDER JOINT REPLACEMENT SURGERY: Primary | ICD-10-CM

## 2020-08-05 DIAGNOSIS — Z96.611 AFTERCARE FOLLOWING RIGHT SHOULDER JOINT REPLACEMENT SURGERY: Primary | ICD-10-CM

## 2020-08-05 PROCEDURE — 97112 NEUROMUSCULAR REEDUCATION: CPT

## 2020-08-05 PROCEDURE — 97014 ELECTRIC STIMULATION THERAPY: CPT

## 2020-08-05 PROCEDURE — 97140 MANUAL THERAPY 1/> REGIONS: CPT

## 2020-08-05 NOTE — PROGRESS NOTES
Today's date: 2020  Patient name: Dee Dee Monahan  : 1960  MRN: [id number]  Referring provider: Siddharth Julien MD  Dx:   Encounter Diagnosis     ICD-10-CM    1  Aftercare following right shoulder joint replacement surgery  Z47 1     Z96 611    2  Acute pain of right shoulder  M25 511      Subjective:  No new c/o pain today  Objective: See treatment log below  Breanna Vargas continues to be advised to follow his home exercise program as tolerated  When Breanna Vargas is feeling good he follows his rehab exercises in the gym and on other days he follows his home exercise program at home as tolerated  In the future we plan on having Pedro stay at home and follow his home exercise program for four to six weeks and than assess for either more Rehab treatments or discharge from Rehab care  Assessment: Tolerated treatment well  Patient exhibited good technique with therapeutic exercises and would benefit from continued PT to increase R shoulder ROM/strength and endurance to improve mobility  Pedro's goals are to continue to improve with his rehab program and improve with functional mobility, speed, repetition and decreased c/o pain with his gym and home exercise program   Pedro's final goal for his rehab is to be discharged from Rehab care after obtaining his full functional rehab potential     Plan: Continue per plan of care  Progress treatment as tolerated  Precautions: Right Total Shoulder Replacement Surgery    All treatments below will be provided with a focus on strengthening, flexibility, ROM, postural,   endurance and any possible swelling and pain which may be present without ignoring   neural issues involving balance, coordination and proprioception which is also important   and necessary to provide full functional mobility and quality care        Daily Treatment Log  Manual  7/27 7/29 8/3 8/    MT, ROM 30' 30' 30' 30'    HEP/Self Care        Exercise Log 7/27 7/29 8/3 8/5    UBC 10' 10' 10' 10'    FWC-Codmans        FWC-Curls,Tri        Finger Ladder 3x ea 3x NT NT    Sveta-BP,PD,Lats,Row 25#LatsPDRow  10#BP 30x 25#-30x 25#LatsPD  15#BPRow  30x 25#LatsPDRow  15#BP 30x    NuStep        W/P-PNF,IR,ER,PU,PS,Throw-Top,Mid,Bot 7 5# 30x 7 5#-30x 7 5# 30x 7 5# 30x    W/P-OH 2x10' 2x10' 2x10' 2x10'    Rotation Bar Sup/Pro 30x 30x 30x 30x    WallSlidesITVY    3x10    WallRedBallEx 3x30 3x30x 3x30 NT    ME, PE 15' 15' 15' 15'            Modalities 7/27 7/29 8/3 8/5    MH/PE 20' 20' 20' 20'

## 2020-08-07 ENCOUNTER — OFFICE VISIT (OUTPATIENT)
Dept: PHYSICAL THERAPY | Facility: CLINIC | Age: 60
End: 2020-08-07
Payer: COMMERCIAL

## 2020-08-07 DIAGNOSIS — M25.511 ACUTE PAIN OF RIGHT SHOULDER: ICD-10-CM

## 2020-08-07 DIAGNOSIS — Z47.1 AFTERCARE FOLLOWING RIGHT SHOULDER JOINT REPLACEMENT SURGERY: Primary | ICD-10-CM

## 2020-08-07 DIAGNOSIS — Z96.611 AFTERCARE FOLLOWING RIGHT SHOULDER JOINT REPLACEMENT SURGERY: Primary | ICD-10-CM

## 2020-08-07 PROCEDURE — 97112 NEUROMUSCULAR REEDUCATION: CPT

## 2020-08-07 PROCEDURE — 97140 MANUAL THERAPY 1/> REGIONS: CPT

## 2020-08-07 NOTE — PROGRESS NOTES
Today's date: 2020  Patient name: Isreal Knox  : 1960  MRN: [id number]  Referring provider: Miguel A Elder MD  Dx:   Encounter Diagnosis     ICD-10-CM    1  Aftercare following right shoulder joint replacement surgery  Z47 1     Z96 611    2  Acute pain of right shoulder  M25 511      Subjective:  No new c/o pain today  Objective: See treatment log below  Kendall De La Torre continues to be advised to follow his home exercise program as tolerated  When Kendall De La Torre is feeling good he follows his rehab exercises in the gym and on other days he follows his home exercise program at home as tolerated  In the future we plan on having Pedro stay at home and follow his home exercise program for four to six weeks and than assess for either more Rehab treatments or discharge from Rehab care  Assessment: Tolerated treatment well  Patient exhibited good technique with therapeutic exercises and would benefit from continued PT to increase R shoulder ROM/strength and endurance to improve mobility  Pedro's goals are to continue to improve with his rehab program and improve with functional mobility, speed, repetition and decreased c/o pain with his gym and home exercise program   Pedro's final goal for his rehab is to be discharged from Rehab care after obtaining his full functional rehab potential     Plan: Continue per plan of care  Progress treatment as tolerated  Precautions: Right Total Shoulder Replacement Surgery    All treatments below will be provided with a focus on strengthening, flexibility, ROM, postural,   endurance and any possible swelling and pain which may be present without ignoring   neural issues involving balance, coordination and proprioception which is also important   and necessary to provide full functional mobility and quality care        Daily Treatment Log  Manual  7/27 7/29 8/3 8/5 8/7   MT, ROM 30' 30' 30' 30' 20'   HEP/Self Care        Exercise Log  7/29 8/3 8/5 8/7   UBC 10' 10' 10' 10' 10'   FW-Codmans        FW-Curls,Tri        Finger Ladder 3x ea 3x NT NT NT   Sveta-BP,PD,Lats,Row 25#LatsPDRow  10#BP 30x 25#-30x 25#LatsPD  15#BPRow  30x 25#LatsPDRow  15#BP 30x 25#LatsPDRow  15#BP 30x   W/P-PNF,IR,ER,PU,PS,Throw-Top,Mid,Bot 7 5# 30x 7 5#-30x 7 5# 30x 7 5# 30x 7 5# 30x   W/P-OH 2x10' 2x10' 2x10' 2x10' 2x10'   Rotation Bar Sup/Pro 30x 30x 30x 30x 30x   WallSlidesITVY    3x10 3x10   WallRedBallEx 3x30 3x30x 3x30 NT 3x30   ME, PE 15' 15' 15' 15' 15'           Modalities 7/27 7/29 8/3 8/5 8/7   MH/PE 20' 20' 20' 20' 20'

## 2020-08-11 ENCOUNTER — OFFICE VISIT (OUTPATIENT)
Dept: PHYSICAL THERAPY | Facility: CLINIC | Age: 60
End: 2020-08-11
Payer: COMMERCIAL

## 2020-08-11 DIAGNOSIS — Z96.611 AFTERCARE FOLLOWING RIGHT SHOULDER JOINT REPLACEMENT SURGERY: Primary | ICD-10-CM

## 2020-08-11 DIAGNOSIS — M25.511 ACUTE PAIN OF RIGHT SHOULDER: ICD-10-CM

## 2020-08-11 DIAGNOSIS — Z47.1 AFTERCARE FOLLOWING RIGHT SHOULDER JOINT REPLACEMENT SURGERY: Primary | ICD-10-CM

## 2020-08-11 PROCEDURE — 97112 NEUROMUSCULAR REEDUCATION: CPT | Performed by: PHYSICAL THERAPIST

## 2020-08-11 PROCEDURE — 97140 MANUAL THERAPY 1/> REGIONS: CPT | Performed by: PHYSICAL THERAPIST

## 2020-08-11 PROCEDURE — 97164 PT RE-EVAL EST PLAN CARE: CPT | Performed by: PHYSICAL THERAPIST

## 2020-08-11 PROCEDURE — 97110 THERAPEUTIC EXERCISES: CPT | Performed by: PHYSICAL THERAPIST

## 2020-08-11 NOTE — LETTER
2020  PT Reevaluation [address], [place]  [address] [place] [address]    Patient: Ginny Britt   YOB: 1960   Date of Visit: 2020     Encounter Diagnosis     ICD-10-CM    1  Aftercare following right shoulder joint replacement surgery  Z47 1     Z96 611    2  Acute pain of right shoulder  M25 511      Dear Dr Fortino Fishman: Thank you for your recent referral of Ginny Britt  Please review the attached evaluation summary from Christopher's recent visit  Please verify that you agree with the plan of care by signing the attached order  If you have any questions or concerns, please do not hesitate to call  I sincerely appreciate the opportunity to share in the care of one of your patients and hope to have another opportunity to work with you in the near future  Sincerely,    Tabatha Justin, PT    Referring Provider:      I certify that I have read the below Plan of Care and certify the need for these services furnished under this plan of treatment while under my care  MD Cele Singh [address] [place] [address],[place] [address] Facsimile: [phone]    Please SIGN ABOVE and return THIS PAGE ONLY to Fax # [phone]    Today's date: 2020  Patient name: Ginny Britt  : 1960  MRN: [id number]  Referring provider: Adam Chiu MD  Dx:   Encounter Diagnosis     ICD-10-CM    1  Aftercare following right shoulder joint replacement surgery  Z47 1     Z96 611    2  Acute pain of right shoulder  M25 511      Subjective:  Mik Gould states his right shoulder is feeling better and better  Objective: See treatment log below  Mik Gould continues to be advised to follow his home exercise program as tolerated  When Mik Gould is feeling good he follows his rehab exercises in the gym and on other days he follows his home exercise program at home as tolerated    In the future we plan on having Rik Carla stay at home and follow his home exercise program for four to six weeks and than assess for either more Rehab treatments or discharge from Rehab care  Assessment: Tolerated treatment well  Patient exhibited good technique with therapeutic exercises and would benefit from continued PT  Pedro's goals are to continue to improve with his rehab program and improve with functional mobility, speed, repetition and decreased c/o pain with his gym and home exercise program   Pedro's final goal for his rehab is to be discharged from Rehab care after obtaining his full functional rehab potential     Plan: Continue per plan of care  Progress treatment as tolerated  Precautions: Right Total Shoulder Replacement Surgery    All treatments below will be provided with a focus on strengthening, flexibility, ROM, postural,   endurance and any possible swelling and pain which may be present without ignoring   neural issues involving balance, coordination and proprioception which is also important   and necessary to provide full functional mobility and quality care  Daily Treatment Log  Manual  8/11  8/3 8/5 8/7   MT, ROM 30'  30' 30' 20'   HEP/Self Care        Exercise Log 8/11  8/3 8/5 8/7   UBC 10'  10' 10' 10'   FWC-Codmans        FWC-Curls,Tri        Finger Ladder   NT NT NT   Sveta-BP,PD,Lats,Row 25#-30x  25#LatsPD  15#BPRow  30x 25#LatsPDRow  15#BP 30x 25#LatsPDRow  15#BP 30x   W/P-PNF,IR,ER,PU,PS,Throw-Top,Mid,Bot 7 5#-30x  7 5# 30x 7 5# 30x 7 5# 30x   W/P-OH 2x10'  2x10' 2x10' 2x10'   Rotation Bar Sup/Pro 30x  30x 30x 30x   WallSlidesITVY 3x30   3x10 3x10   WallRedBallEx 3x30  3x30 NT 3x30   ME, PE 15'  15' 15' 15'           Modalities 8/11  8/3 8/5 8/7   MH/PE 20'  20' 20' 20'       PT Re-Evaluation   Today's date: 2020    Patient name: Fredy Long  : 1960  MRN: [id number]  Referring provider: Jose Campa MD  Dx:   Encounter Diagnosis     ICD-10-CM    1   Aftercare following right shoulder joint replacement surgery  Z47 1     Z96 611    2  Acute pain of right shoulder  M25 511      Assessment  Assessment details: Patient is progressing with his shoulders rehab  He still has pain and limitations with strength and ROM but he has improved compared to when he started  Impairments: abnormal or restricted ROM, abnormal movement, activity intolerance, impaired physical strength, pain with function and safety issue  Understanding of Dx/Px/POC: excellent   Prognosis: good    Goals  STG 2-4 weeks:   Increase UE strength by 3-6 lbs  Decrease pain by 1-2 levels on 1-10 pain scale  Increase UE PROM by 10-15 Degrees in all planes  Reduce C/O pain with lying on either side  Initiate HEP  LTG 6-8 weeks:   Increase UE strength 10-20 lbs  Demonstrate UE AROM WFL in all planes  Decrease pain to <2  Improve strength by 10-20 lbs  Return to lying on their right or left side with minimal difficulty  Improve sleep status limitations to none  D/C with HEP  Plan  Plan details: All planned modality interventions and planned therapy interventions are provided PRN    Patient would benefit from: PT eval and skilled physical therapy  Planned modality interventions: unattended electrical stimulation  Planned therapy interventions: joint mobilization, manual therapy, neuromuscular re-education, patient education, postural training, self care, strengthening, stretching, therapeutic activities, therapeutic exercise, therapeutic training, transfer training, home exercise program, graded exercise, flexibility and coordination  Frequency: 3x week  Duration in weeks: 12  Treatment plan discussed with: patient      Subjective Evaluation    Pain  Aggravating factors: lifting, overhead activity and keyboarding  Progression: improved    Treatments  Previous treatment: physical therapy  Current treatment: physical therapy  Patient Goals  Patient goals for therapy: decreased pain, increased motion, return to work, return to [employer] activities, independence with ADLs/IADLs and increased strength      Date of onset:  5/22/2020    Date of Surgery:  5/22/2020    History of Present Episode: 6/8/2020  Carol Leonard states his right shoulder became almost non-functional   He went for a right shoulder reverse total shoulder replacement  Past Medical History:    6/8/2020  Carol Leonard reports diabetes type two  HTN  CABGx2    Previous Level of Functional Ability:  6/8/2020  Carol Leonard states his right and left shoulder worked well many years ago but his shoulder went down hill and he required a TSR Sx  Inspection / Palpation:  Shoulder:  6/8/2020  Endomorphic body type  No signs of infection  No signs of wounds  No signs of drainage  Mild signs of ecchymotic regions  Mild signs of erythremic regions  Mild to moderate signs of muscle spasm  Mild to moderate signs of muscle guarding  Moderate signs of tenderness reported to palpation  Mild to moderate signs of swelling  Positive signs of a surgery site  Current conditions appears consistent with recent acute episode  Chief Complaints:  6/8/2020  Carlo Leonard reports moderate to severe difficulty with bending his right shoulder  Carol Leonard reports moderate to severe difficulty with movement of his right shoulder  Carol Leonard reports moderate to severe difficulty with use of his right arm  Carol Leonard reports moderate to severe difficulty with lifting / elevating his right arm  Carol Leonard reports mild difficulty with sleeping  Carol Leonard reports moderate to severe difficulty with his strength and endurance  Carol Leonard reports moderate to severe limitations with his right shoulder range of motion  Carol Leonard reports severe difficulty lying on his right shoulder region      SHOULDER PAIN Resting Palpation Moving Lifting Elevating   6/8/2020 Lt 0 0 0 0 0   6/8/2020 Rt  0 0-2 0-2 0-4 0-4   7/8/2020 Rt 0 0-1 0-1 0-3 0-3   8/11/2020 Rt 0 0-1 0-1 0-2 0-2     SHOULDER PAIN Sleeping Throwing Pushing Pulling Twisting   6/8/2020 Lt 0 0 0 0 0   6/8/2020 Rt  0-2 NA 0-4 0-4 0-4   7/8/2020 Rt 0-1 NA 0-3 0-3 0-3   8/11/2020 Rt 0-1 NA 0-2 0-2 0-2     SHOULDER AROM Flexion Extension Abduction   6/8/2020 Lt 175° 60° 175°   6/8/2020 Rt 84° 23° 84°   7/8/2020 Rt 145° 48° 145°   8/11/2020 Rt 167° 56° 167°     SHOULDER AROM Hor Add Ext Rotation Int Rotation   6/8/2020 Lt 38° 45° 42°   6/8/2020 Rt 21° 42° 40°   7/8/2020 Rt 29° 44° 42°   8/11/2020 Rt 39° 67° 53°     SHLD MMT / PAIN Flexion Extension Abduction   6/8/2020 Lt 0/10  45 lbs 0/10  42 lbs 0/10  25 lbs   6/8/2020 Rt 5/10  2 lbs 5/10  3 lbs 5/10  2 lbs   7/8/2020 Rt 4/10  5 lbs 4/10  6 lbs 4/10  5 lbs   8/11/2020 Rt 3/10   11 lbs 3/10  12 lbs 3/10  11 lbs     SHLD MMT / PAIN Hor Add Ext Rotation Int Rotation   6/8/2020 Lt 0/10  34 lbs 0/10  22 lbs 0/10  29 lbs   6/8/2020 Rt 5/10  3 lbs 5/10  2 lbs 5/10  3 lbs   7/8/2020 Rt 4/10  6 lbs 4/10  5 lbs 4/10  6 lbs   8/11 2020 Rt 3/10  10 lbs 3/10  10 lbs 3/10  11 lbs     Shoulder Screen Rotator Cuff Apprehension Anterior  Stability Posterior  Stability   6/8/2020 Rt Negative Negative Negative Negative   6/8/2020 Lt Negative Negative Negative Negative     Shoulder Screen AC Joint SC Joint Drop Arm Adhesive Capsulitis   6/8/2020 Rt Negative Negative Negative Negative   6/8/2020 Lt Negative Negative Negative Negative     Precautions: Right Total Shoulder Replacement Surgery

## 2020-08-11 NOTE — PROGRESS NOTES
Today's date: 2020  Patient name: Kym Albright  : 1960  MRN: [id number]  Referring provider: Maricarmen Chi MD  Dx:   Encounter Diagnosis     ICD-10-CM    1  Aftercare following right shoulder joint replacement surgery  Z47 1     Z96 611    2  Acute pain of right shoulder  M25 511      Subjective:  Lisa Morales states his right shoulder is feeling better and better  Objective: See treatment log below  Lisa Morales continues to be advised to follow his home exercise program as tolerated  When Lisa Morales is feeling good he follows his rehab exercises in the gym and on other days he follows his home exercise program at home as tolerated  In the future we plan on having Pedro stay at home and follow his home exercise program for four to six weeks and than assess for either more Rehab treatments or discharge from Rehab care  Assessment: Tolerated treatment well  Patient exhibited good technique with therapeutic exercises and would benefit from continued PT  Pedro's goals are to continue to improve with his rehab program and improve with functional mobility, speed, repetition and decreased c/o pain with his gym and home exercise program   Pedro's final goal for his rehab is to be discharged from Rehab care after obtaining his full functional rehab potential     Plan: Continue per plan of care  Progress treatment as tolerated  Precautions: Right Total Shoulder Replacement Surgery    All treatments below will be provided with a focus on strengthening, flexibility, ROM, postural,   endurance and any possible swelling and pain which may be present without ignoring   neural issues involving balance, coordination and proprioception which is also important   and necessary to provide full functional mobility and quality care        Daily Treatment Log  Manual  8/11  8/3 8/5 8/7   MT, ROM 30'  30' 30' 20'   HEP/Self Care        Exercise Log 8/11  8/3 8/5 8/7   UBC 10'  10' 10' 10' FWC-Codmans        FWC-Curls,Tri        Finger Ladder   NT NT NT   Sveta-BP,PD,Lats,Row 25#-30x  25#LatsPD  15#BPRow  30x 25#LatsPDRow  15#BP 30x 25#LatsPDRow  15#BP 30x   W/P-PNF,IR,ER,PU,PS,Throw-Top,Mid,Bot 7 5#-30x  7 5# 30x 7 5# 30x 7 5# 30x   W/P-OH 2x10'  2x10' 2x10' 2x10'   Rotation Bar Sup/Pro 30x  30x 30x 30x   WallSlidesITVY 3x30   3x10 3x10   WallRedBallEx 3x30  3x30 NT 3x30   ME, PE 15'  15' 15' 15'           Modalities 8/11  8/3 8/5 8/7   MH/PE 20'  20' 20' 20'

## 2020-08-13 ENCOUNTER — APPOINTMENT (OUTPATIENT)
Dept: PHYSICAL THERAPY | Facility: CLINIC | Age: 60
End: 2020-08-13
Payer: COMMERCIAL

## 2020-08-18 ENCOUNTER — OFFICE VISIT (OUTPATIENT)
Dept: PHYSICAL THERAPY | Facility: CLINIC | Age: 60
End: 2020-08-18
Payer: COMMERCIAL

## 2020-08-18 DIAGNOSIS — M25.511 ACUTE PAIN OF RIGHT SHOULDER: ICD-10-CM

## 2020-08-18 DIAGNOSIS — Z47.1 AFTERCARE FOLLOWING RIGHT SHOULDER JOINT REPLACEMENT SURGERY: Primary | ICD-10-CM

## 2020-08-18 DIAGNOSIS — Z96.611 AFTERCARE FOLLOWING RIGHT SHOULDER JOINT REPLACEMENT SURGERY: Primary | ICD-10-CM

## 2020-08-18 PROCEDURE — 97140 MANUAL THERAPY 1/> REGIONS: CPT

## 2020-08-18 PROCEDURE — 97112 NEUROMUSCULAR REEDUCATION: CPT

## 2020-08-18 NOTE — PROGRESS NOTES
Today's date: 2020  Patient name: Axel Galicia  : 1960  MRN: [id number]  Referring provider: Myles Machado MD  Dx:   Encounter Diagnosis     ICD-10-CM    1  Aftercare following right shoulder joint replacement surgery  Z47 1     Z96 611    2  Acute pain of right shoulder  M25 511      Subjective:  No new c/o pain today  Objective: See treatment log below  hTomas Smoker continues to be advised to follow his home exercise program as tolerated  When Thomas Smoker is feeling good he follows his rehab exercises in the gym and on other days he follows his home exercise program at home as tolerated  In the future we plan on having Pedro stay at home and follow his home exercise program for four to six weeks and than assess for either more Rehab treatments or discharge from Rehab care  Assessment: Tolerated treatment well  Patient exhibited good technique with therapeutic exercises and would benefit from continued PT to increase R shoulder ROM/strength and endurance to improve mobility  Pedro's goals are to continue to improve with his rehab program and improve with functional mobility, speed, repetition and decreased c/o pain with his gym and home exercise program   Pedro's final goal for his rehab is to be discharged from Rehab care after obtaining his full functional rehab potential     Plan: Continue per plan of care  Progress treatment as tolerated  Precautions: Right Total Shoulder Replacement Surgery    All treatments below will be provided with a focus on strengthening, flexibility, ROM, postural,   endurance and any possible swelling and pain which may be present without ignoring   neural issues involving balance, coordination and proprioception which is also important   and necessary to provide full functional mobility and quality care        Daily Treatment Log  Manual        MT, ROM 30' 30'      HEP/Self Care        Exercise Log       UBC 10' 10'      FW-Codmans        [facility]-Curls,Tri        Finger Ladder        Sveta-BP,PD,Lats,Row 25#-30x 25#LatsPDRow  15#BP 30x      W/P-PNF,IR,ER,PU,PS,Throw-Top,Mid,Bot 7 5#-30x 10# Top  7 5# Mid  5# Bot 30x      W/P-OH 2x10' 2x10      Rotation Bar Sup/Pro 30x NT      WallSlidesITVY 3x30 3x30      Yuliya John      ME, PE 15' 15'              Modalities 8/11 8/18      /PE 20' 20'

## 2020-08-18 NOTE — PROGRESS NOTES
PT Re-Evaluation   Today's date: 2020    Patient name: Ann Babinski  : 1960  MRN: [id number]  Referring provider: Jess Salinas MD  Dx:   Encounter Diagnosis     ICD-10-CM    1  Aftercare following right shoulder joint replacement surgery  Z47 1     Z96 611    2  Acute pain of right shoulder  M25 511      Assessment  Assessment details: Patient is progressing with his shoulders rehab  He still has pain and limitations with strength and ROM but he has improved compared to when he started  Impairments: abnormal or restricted ROM, abnormal movement, activity intolerance, impaired physical strength, pain with function and safety issue  Understanding of Dx/Px/POC: excellent   Prognosis: good    Goals  STG 2-4 weeks:   Increase UE strength by 3-6 lbs  Decrease pain by 1-2 levels on 1-10 pain scale  Increase UE PROM by 10-15 Degrees in all planes  Reduce C/O pain with lying on either side  Initiate HEP  LTG 6-8 weeks:   Increase UE strength 10-20 lbs  Demonstrate UE AROM WFL in all planes  Decrease pain to <2  Improve strength by 10-20 lbs  Return to lying on their right or left side with minimal difficulty  Improve sleep status limitations to none  D/C with HEP  Plan  Plan details: All planned modality interventions and planned therapy interventions are provided PRN    Patient would benefit from: PT eval and skilled physical therapy  Planned modality interventions: unattended electrical stimulation  Planned therapy interventions: joint mobilization, manual therapy, neuromuscular re-education, patient education, postural training, self care, strengthening, stretching, therapeutic activities, therapeutic exercise, therapeutic training, transfer training, home exercise program, graded exercise, flexibility and coordination  Frequency: 3x week  Duration in weeks: 12  Treatment plan discussed with: patient      Subjective Evaluation    Pain  Aggravating factors: lifting, overhead activity and keyboarding  Progression: improved    Treatments  Previous treatment: physical therapy  Current treatment: physical therapy  Patient Goals  Patient goals for therapy: decreased pain, increased motion, return to work, return to [employer] activities, independence with ADLs/IADLs and increased strength      Date of onset:  5/22/2020    Date of Surgery:  5/22/2020    History of Present Episode: 6/8/2020  Vicky Garza states his right shoulder became almost non-functional   He went for a right shoulder reverse total shoulder replacement  Past Medical History:    6/8/2020  Vicky Garza reports diabetes type two  HTN  CABGx2    Previous Level of Functional Ability:  6/8/2020  Vciky Garza states his right and left shoulder worked well many years ago but his shoulder went down hill and he required a TSR Sx  Inspection / Palpation:  Shoulder:  6/8/2020  Endomorphic body type  No signs of infection  No signs of wounds  No signs of drainage  Mild signs of ecchymotic regions  Mild signs of erythremic regions  Mild to moderate signs of muscle spasm  Mild to moderate signs of muscle guarding  Moderate signs of tenderness reported to palpation  Mild to moderate signs of swelling  Positive signs of a surgery site  Current conditions appears consistent with recent acute episode  Chief Complaints:  6/8/2020  Vicky Garza reports moderate to severe difficulty with bending his right shoulder  Vicky Garza reports moderate to severe difficulty with movement of his right shoulder  Vicky Garza reports moderate to severe difficulty with use of his right arm  Vicky Garza reports moderate to severe difficulty with lifting / elevating his right arm  Vicky Garza reports mild difficulty with sleeping  Vicky Garza reports moderate to severe difficulty with his strength and endurance  Vicky Garza reports moderate to severe limitations with his right shoulder range of motion    Vicky Garza reports severe difficulty lying on his right shoulder region      SHOULDER PAIN Resting Palpation Moving Lifting Elevating   6/8/2020 Lt 0 0 0 0 0   6/8/2020 Rt  0 0-2 0-2 0-4 0-4   7/8/2020 Rt 0 0-1 0-1 0-3 0-3   8/11/2020 Rt 0 0-1 0-1 0-2 0-2     SHOULDER PAIN Sleeping Throwing Pushing Pulling Twisting   6/8/2020 Lt 0 0 0 0 0   6/8/2020 Rt  0-2 NA 0-4 0-4 0-4   7/8/2020 Rt 0-1 NA 0-3 0-3 0-3   8/11/2020 Rt 0-1 NA 0-2 0-2 0-2     SHOULDER AROM Flexion Extension Abduction   6/8/2020 Lt 175° 60° 175°   6/8/2020 Rt 84° 23° 84°   7/8/2020 Rt 145° 48° 145°   8/11/2020 Rt 167° 56° 167°     SHOULDER AROM Hor Add Ext Rotation Int Rotation   6/8/2020 Lt 38° 45° 42°   6/8/2020 Rt 21° 42° 40°   7/8/2020 Rt 29° 44° 42°   8/11/2020 Rt 39° 67° 53°     SHLD MMT / PAIN Flexion Extension Abduction   6/8/2020 Lt 0/10  45 lbs 0/10  42 lbs 0/10  25 lbs   6/8/2020 Rt 5/10  2 lbs 5/10  3 lbs 5/10  2 lbs   7/8/2020 Rt 4/10  5 lbs 4/10  6 lbs 4/10  5 lbs   8/11/2020 Rt 3/10   11 lbs 3/10  12 lbs 3/10  11 lbs     SHLD MMT / PAIN Hor Add Ext Rotation Int Rotation   6/8/2020 Lt 0/10  34 lbs 0/10  22 lbs 0/10  29 lbs   6/8/2020 Rt 5/10  3 lbs 5/10  2 lbs 5/10  3 lbs   7/8/2020 Rt 4/10  6 lbs 4/10  5 lbs 4/10  6 lbs   8/11 2020 Rt 3/10  10 lbs 3/10  10 lbs 3/10  11 lbs     Shoulder Screen Rotator Cuff Apprehension Anterior  Stability Posterior  Stability   6/8/2020 Rt Negative Negative Negative Negative   6/8/2020 Lt Negative Negative Negative Negative     Shoulder Screen AC Joint SC Joint Drop Arm Adhesive Capsulitis   6/8/2020 Rt Negative Negative Negative Negative   6/8/2020 Lt Negative Negative Negative Negative     Precautions: Right Total Shoulder Replacement Surgery

## 2020-08-19 ENCOUNTER — TRANSCRIBE ORDERS (OUTPATIENT)
Dept: PHYSICAL THERAPY | Facility: CLINIC | Age: 60
End: 2020-08-19

## 2020-08-19 DIAGNOSIS — M25.511 ACUTE PAIN OF RIGHT SHOULDER: ICD-10-CM

## 2020-08-19 DIAGNOSIS — Z96.611 AFTERCARE FOLLOWING RIGHT SHOULDER JOINT REPLACEMENT SURGERY: Primary | ICD-10-CM

## 2020-08-19 DIAGNOSIS — Z47.1 AFTERCARE FOLLOWING RIGHT SHOULDER JOINT REPLACEMENT SURGERY: Primary | ICD-10-CM

## 2020-08-20 ENCOUNTER — OFFICE VISIT (OUTPATIENT)
Dept: PHYSICAL THERAPY | Facility: CLINIC | Age: 60
End: 2020-08-20
Payer: COMMERCIAL

## 2020-08-20 DIAGNOSIS — Z47.1 AFTERCARE FOLLOWING RIGHT SHOULDER JOINT REPLACEMENT SURGERY: Primary | ICD-10-CM

## 2020-08-20 DIAGNOSIS — Z96.611 AFTERCARE FOLLOWING RIGHT SHOULDER JOINT REPLACEMENT SURGERY: Primary | ICD-10-CM

## 2020-08-20 DIAGNOSIS — M25.511 ACUTE PAIN OF RIGHT SHOULDER: ICD-10-CM

## 2020-08-20 PROCEDURE — 97140 MANUAL THERAPY 1/> REGIONS: CPT | Performed by: PHYSICAL THERAPIST

## 2020-08-20 PROCEDURE — 97112 NEUROMUSCULAR REEDUCATION: CPT | Performed by: PHYSICAL THERAPIST

## 2020-08-20 PROCEDURE — 97110 THERAPEUTIC EXERCISES: CPT | Performed by: PHYSICAL THERAPIST

## 2020-08-20 NOTE — PROGRESS NOTES
Today's date: 2020  Patient name: Duarte Kaufman  : 1960  MRN: [id number]  Referring provider: Jose Luis Rogers MD  Dx:   Encounter Diagnosis     ICD-10-CM    1  Aftercare following right shoulder joint replacement surgery  Z47 1     Z96 611    2  Acute pain of right shoulder  M25 511      Subjective:  Ede Shea states his right shoulder is slowly getting better  Objective: See treatment log below  Ede Shea continues to be advised to follow his home exercise program as tolerated  When Ede Shea is feeling good he follows his rehab exercises in the gym and on other days he follows his home exercise program at home as tolerated  In the future we plan on having Pedro stay at home and follow his home exercise program for four to six weeks and than assess for either more Rehab treatments or discharge from Rehab care  Assessment: Tolerated treatment well  Patient exhibited good technique with therapeutic exercises and would benefit from continued PT  Pedro's goals are to continue to improve with his rehab program and improve with functional mobility, speed, repetition and decreased c/o pain with his gym and home exercise program   Pedro's final goal for his rehab is to be discharged from Rehab care after obtaining his full functional rehab potential     Plan: Continue per plan of care  Progress treatment as tolerated  Precautions: Right Total Shoulder Replacement Surgery    All treatments below will be provided with a focus on strengthening, flexibility, ROM, postural,   endurance and any possible swelling and pain which may be present without ignoring   neural issues involving balance, coordination and proprioception which is also important   and necessary to provide full functional mobility and quality care        Daily Treatment Log  Manual       MT, ROM 30' 30' 30'     HEP/Self Care        Exercise Log      [facility] 10' 10' 10' [facility]-Codmans        [facility]-Curls,Tri        Finger Ladder        Sveta-BP,PD,Lats,Row 25#-30x 25#LatsPDRow  15#BP 30x 25#-30x     W/P-PNF,IR,ER,PU,PS,Throw-Top,Mid,Bot 7 5#-30x 10# Top  7 5# Mid  5# Bot 30x 7 5#-30x     W/P-OH 2x10' 2x10 2x10'     Rotation Bar Sup/Pro 30x NT      WallSlidesITVY 3x30 3x30 3x30     WallRedBallEx 3x30 3x30 3x30x     ME, PE 15' 15' 15'             Modalities 8/11 8/18 8/20     MH/PE 20' 20' 20'

## 2020-08-25 ENCOUNTER — OFFICE VISIT (OUTPATIENT)
Dept: PHYSICAL THERAPY | Facility: CLINIC | Age: 60
End: 2020-08-25
Payer: COMMERCIAL

## 2020-08-25 DIAGNOSIS — Z96.611 AFTERCARE FOLLOWING RIGHT SHOULDER JOINT REPLACEMENT SURGERY: Primary | ICD-10-CM

## 2020-08-25 DIAGNOSIS — M25.511 ACUTE PAIN OF RIGHT SHOULDER: ICD-10-CM

## 2020-08-25 DIAGNOSIS — Z47.1 AFTERCARE FOLLOWING RIGHT SHOULDER JOINT REPLACEMENT SURGERY: Primary | ICD-10-CM

## 2020-08-25 PROCEDURE — 97140 MANUAL THERAPY 1/> REGIONS: CPT

## 2020-08-25 PROCEDURE — 97112 NEUROMUSCULAR REEDUCATION: CPT

## 2020-08-25 NOTE — PROGRESS NOTES
Today's date: 2020  Patient name: Ginny Britt  : 1960  MRN: [id number]  Referring provider: Adam Chiu MD  Dx:   Encounter Diagnosis     ICD-10-CM    1  Aftercare following right shoulder joint replacement surgery  Z47 1     Z96 611    2  Acute pain of right shoulder  M25 511      Subjective:  "My dr would like my R shoulder strength a little better "    Objective: See treatment log below  Mik Gould continues to be advised to follow his home exercise program as tolerated  When Mik Gould is feeling good he follows his rehab exercises in the gym and on other days he follows his home exercise program at home as tolerated  In the future we plan on having Pedro stay at home and follow his home exercise program for four to six weeks and than assess for either more Rehab treatments or discharge from Rehab care  Assessment: Tolerated treatment well  Patient exhibited good technique with therapeutic exercises and would benefit from continued PT to increase R shoulder ROM/strength and endurance to improve mobility  Pedro's goals are to continue to improve with his rehab program and improve with functional mobility, speed, repetition and decreased c/o pain with his gym and home exercise program   Pedro's final goal for his rehab is to be discharged from Rehab care after obtaining his full functional rehab potential     Plan: Continue per plan of care  Progress treatment as tolerated  Precautions: Right Total Shoulder Replacement Surgery    All treatments below will be provided with a focus on strengthening, flexibility, ROM, postural,   endurance and any possible swelling and pain which may be present without ignoring   neural issues involving balance, coordination and proprioception which is also important   and necessary to provide full functional mobility and quality care        Daily Treatment Log  Manual      MT, ROM 30' 30' 30' 20'    HEP/Self Care        Exercise Log 8/11 8/18 8/20 8/25    [facility] 10' 10' 10' 10'    [facility]-Codmans        [facility]-Curls,Tri        Finger Ladder        Sveta-BP,PD,Lats,Row 25#-30x 25#LatsPDRow  15#BP 30x 25#-30x 25# 30x    W/P-PNF,IR,ER,PU,PS,Throw-Top,Mid,Bot 7 5#-30x 10# Top  7 5# Mid  5# Bot 30x 7 5#-30x 12  5#Top  10#Mid  7  5#Bot 30x    W/P-OH 2x10' 2x10 2x10' 2x10'    Rotation Bar Sup/Pro 30x NT      WallSlidesITVY 3x30 3x30 3x30 3x30    WallRedBallEx 3x30 3x30 3x30x 3x30    ME, PE 15' 15' 15' 15'            Modalities 8/11 8/18 8/20 8/25    MH/PE 20' 20' 20' 20'

## 2020-08-27 ENCOUNTER — OFFICE VISIT (OUTPATIENT)
Dept: PHYSICAL THERAPY | Facility: CLINIC | Age: 60
End: 2020-08-27
Payer: COMMERCIAL

## 2020-08-27 DIAGNOSIS — Z47.1 AFTERCARE FOLLOWING RIGHT SHOULDER JOINT REPLACEMENT SURGERY: Primary | ICD-10-CM

## 2020-08-27 DIAGNOSIS — Z96.611 AFTERCARE FOLLOWING RIGHT SHOULDER JOINT REPLACEMENT SURGERY: Primary | ICD-10-CM

## 2020-08-27 DIAGNOSIS — M25.511 ACUTE PAIN OF RIGHT SHOULDER: ICD-10-CM

## 2020-08-27 PROCEDURE — 97112 NEUROMUSCULAR REEDUCATION: CPT

## 2020-08-27 PROCEDURE — 97140 MANUAL THERAPY 1/> REGIONS: CPT

## 2020-08-27 NOTE — PROGRESS NOTES
Today's date: 2020  Patient name: Lauren Mendiola  : 1960  MRN: [id number]  Referring provider: Klaus Crockett MD  Dx:   Encounter Diagnosis     ICD-10-CM    1  Aftercare following right shoulder joint replacement surgery  Z47 1     Z96 611    2  Acute pain of right shoulder  M25 511      Subjective:  No new c/o pain today  Objective: See treatment log below  Josselin Izquierdo continues to be advised to follow his home exercise program as tolerated  When Josselin Izquierdo is feeling good he follows his rehab exercises in the gym and on other days he follows his home exercise program at home as tolerated  In the future we plan on having Pedro stay at home and follow his home exercise program for four to six weeks and than assess for either more Rehab treatments or discharge from Rehab care  Assessment: Tolerated treatment well  Patient exhibited good technique with therapeutic exercises and would benefit from continued PT to increase R shoulder ROM/strength and endurance to improve mobility  Pedro's goals are to continue to improve with his rehab program and improve with functional mobility, speed, repetition and decreased c/o pain with his gym and home exercise program   Pedro's final goal for his rehab is to be discharged from Rehab care after obtaining his full functional rehab potential     Plan: Continue per plan of care  Progress treatment as tolerated  Precautions: Right Total Shoulder Replacement Surgery    All treatments below will be provided with a focus on strengthening, flexibility, ROM, postural,   endurance and any possible swelling and pain which may be present without ignoring   neural issues involving balance, coordination and proprioception which is also important   and necessary to provide full functional mobility and quality care        Daily Treatment Log  Manual     MT, ROM 30' 30' 30' 20' 30'   HEP/Self Care        Exercise Log  8/18 8/20 8/25 8/27   [facility] 10' 10' 10' 10' 10'   [facility]-Codmans        [facility]-Curls,Tri        Finger Ladder        Sveta-BP,PD,Lats,Row 25#-30x 25#LatsPDRow  15#BP 30x 25#-30x 25# 30x 30#LatsPDRow  20#BP 30x   W/P-PNF,IR,ER,PU,PS,Throw-Top,Mid,Bot 7 5#-30x 10# Top  7 5# Mid  5# Bot 30x 7 5#-30x 12  5#Top  10#Mid  7  5#Bot 30x 12  5#Top 10#Mid  7  5#Bot  30x   W/P-OH 2x10' 2x10 2x10' 2x10' 2x10'   Rotation Bar Sup/Pro 30x NT      WallSlidesITVY 3x30 3x30 3x30 3x30 3x30   WallRedBallEx 3x30 3x30 3x30x 3x30 3x30   ME, PE 15' 15' 15' 15' 15'           Modalities 8/11 8/18 8/20 8/25 8/27   MH/PE 20' 20' 20' 20' 20'

## 2020-09-01 ENCOUNTER — OFFICE VISIT (OUTPATIENT)
Dept: PHYSICAL THERAPY | Facility: CLINIC | Age: 60
End: 2020-09-01
Payer: COMMERCIAL

## 2020-09-01 DIAGNOSIS — M25.511 ACUTE PAIN OF RIGHT SHOULDER: ICD-10-CM

## 2020-09-01 DIAGNOSIS — Z47.1 AFTERCARE FOLLOWING RIGHT SHOULDER JOINT REPLACEMENT SURGERY: Primary | ICD-10-CM

## 2020-09-01 DIAGNOSIS — Z96.611 AFTERCARE FOLLOWING RIGHT SHOULDER JOINT REPLACEMENT SURGERY: Primary | ICD-10-CM

## 2020-09-01 PROCEDURE — 97110 THERAPEUTIC EXERCISES: CPT | Performed by: PHYSICAL THERAPIST

## 2020-09-01 PROCEDURE — 97140 MANUAL THERAPY 1/> REGIONS: CPT | Performed by: PHYSICAL THERAPIST

## 2020-09-01 PROCEDURE — 97112 NEUROMUSCULAR REEDUCATION: CPT | Performed by: PHYSICAL THERAPIST

## 2020-09-01 NOTE — PROGRESS NOTES
Today's date: 2020  Patient name: Cinda Tanner  : 1960  MRN: [id number]  Referring provider: Nataliya Caraballo MD  Dx:   Encounter Diagnosis     ICD-10-CM    1  Aftercare following right shoulder joint replacement surgery  Z47 1     Z96 611    2  Acute pain of right shoulder  M25 511      Subjective:  Vicky Garza states his shoulder is slowly progressing with power and endurance  Still had to lift over his head with any power and it always hurts so far  Objective: See treatment log below  Vicky Garza continues to be advised to follow his home exercise program as tolerated  When Vicky Garza is feeling good he follows his rehab exercises in the gym and on other days he follows his home exercise program at home as tolerated  In the future we plan on having Pedro stay at home and follow his home exercise program for four to six weeks and than assess for either more Rehab treatments or discharge from Rehab care  Assessment: Tolerated treatment well  Patient exhibited good technique with therapeutic exercises and would benefit from continued PT  Pedro's goals are to continue to improve with his rehab program and improve with functional mobility, speed, repetition and decreased c/o pain with his gym and home exercise program   Pedro's final goal for his rehab is to be discharged from Rehab care after obtaining his full functional rehab potential     Plan: Continue per plan of care  Progress treatment as tolerated  Precautions: Right Total Shoulder Replacement Surgery    All treatments below will be provided with a focus on strengthening, flexibility, ROM, postural,   endurance and any possible swelling and pain which may be present without ignoring   neural issues involving balance, coordination and proprioception which is also important   and necessary to provide full functional mobility and quality care        Daily Treatment Log  Manual         MT, ROM 30' HEP/Self Care        Exercise Log 9/1       [facility] 10'       [facility]-Codmans        [facility]-Curls,Tri        Finger Ladder        Sveta-BP,PD,Lats,Row 35#LatPD Row 25-BP 30x       W/P-PNF,IR,ER,PU,PS,Throw-Top,Mid,Bot 12  5#Top  10#Mid  7  5#Bot30x       W/P-OH 2x10'       Rotation Bar Sup/Pro        WallSlidesITVY 3x30x       WallRedBallEx 3x30x       ME, PE 15'               Modalities 9/1       MH/PE 20'

## 2020-09-03 ENCOUNTER — OFFICE VISIT (OUTPATIENT)
Dept: PHYSICAL THERAPY | Facility: CLINIC | Age: 60
End: 2020-09-03
Payer: COMMERCIAL

## 2020-09-03 DIAGNOSIS — Z47.1 AFTERCARE FOLLOWING RIGHT SHOULDER JOINT REPLACEMENT SURGERY: Primary | ICD-10-CM

## 2020-09-03 DIAGNOSIS — M25.511 ACUTE PAIN OF RIGHT SHOULDER: ICD-10-CM

## 2020-09-03 DIAGNOSIS — Z96.611 AFTERCARE FOLLOWING RIGHT SHOULDER JOINT REPLACEMENT SURGERY: Primary | ICD-10-CM

## 2020-09-03 PROCEDURE — 97110 THERAPEUTIC EXERCISES: CPT | Performed by: PHYSICAL THERAPIST

## 2020-09-03 PROCEDURE — 97140 MANUAL THERAPY 1/> REGIONS: CPT | Performed by: PHYSICAL THERAPIST

## 2020-09-03 PROCEDURE — 97112 NEUROMUSCULAR REEDUCATION: CPT | Performed by: PHYSICAL THERAPIST

## 2020-09-03 NOTE — PROGRESS NOTES
Today's date: 9/3/2020  Patient name: Fredy Long  : 1960  MRN: [id number]  Referring provider: Jose Campa MD  Dx:   Encounter Diagnosis     ICD-10-CM    1  Aftercare following right shoulder joint replacement surgery  Z47 1     Z96 611    2  Acute pain of right shoulder  M25 511      Subjective:  Rik Aguirre states his right shoulder is slowly improving  Still limited and weak but better  Objective: See treatment log below  Rik Aguirre continues to be advised to follow his home exercise program as tolerated  When Rik Aguirre is feeling good he follows his rehab exercises in the gym and on other days he follows his home exercise program at home as tolerated  In the future we plan on having Cezarpavel stay at home and follow his home exercise program for four to six weeks and than assess for either more Rehab treatments or discharge from Rehab care  Assessment: Tolerated treatment well  Patient exhibited good technique with therapeutic exercises and would benefit from continued PT  Pedro's goals are to continue to improve with his rehab program and improve with functional mobility, speed, repetition and decreased c/o pain with his gym and home exercise program   Pedro's final goal for his rehab is to be discharged from Rehab care after obtaining his full functional rehab potential     Plan: Continue per plan of care  Progress treatment as tolerated  Precautions: Right Total Shoulder Replacement Surgery    All treatments below will be provided with a focus on strengthening, flexibility, ROM, postural,   endurance and any possible swelling and pain which may be present without ignoring   neural issues involving balance, coordination and proprioception which is also important   and necessary to provide full functional mobility and quality care        Daily Treatment Log  Manual  9/1 9/3      MT, ROM 30' 30'      HEP/Self Care        Exercise Log 9/1 9/3      [facility] 10' 10' FWC-Codmans        FW-Curls,Tri        Finger Ladder        Sveta-BP,PD,Lats,Row 35#LatPD Row 25-BP 30x 35#-[id number]  25#BP30x      W/P-PNF,IR,ER,PU,PS,Throw-Top,Mid,Bot 12  5#Top  10#Mid  7  5#Bot30x 12  5#Top  12  5#Mid  12  5#Bot      W/P-OH 2x10' 2x10'      Rotation Bar Sup/Pro        WallSlidesITVY 3x30x 3x30x      WallRedBallEx 3x30x 3x30x      ME, PE 15' 15'              Modalities 9/1 9/3      MH/PE 20' 20'

## 2020-09-08 ENCOUNTER — OFFICE VISIT (OUTPATIENT)
Dept: PHYSICAL THERAPY | Facility: CLINIC | Age: 60
End: 2020-09-08
Payer: COMMERCIAL

## 2020-09-08 DIAGNOSIS — M25.511 ACUTE PAIN OF RIGHT SHOULDER: ICD-10-CM

## 2020-09-08 DIAGNOSIS — Z47.1 AFTERCARE FOLLOWING RIGHT SHOULDER JOINT REPLACEMENT SURGERY: Primary | ICD-10-CM

## 2020-09-08 DIAGNOSIS — Z96.611 AFTERCARE FOLLOWING RIGHT SHOULDER JOINT REPLACEMENT SURGERY: Primary | ICD-10-CM

## 2020-09-08 PROCEDURE — 97112 NEUROMUSCULAR REEDUCATION: CPT | Performed by: PHYSICAL THERAPIST

## 2020-09-08 PROCEDURE — 97140 MANUAL THERAPY 1/> REGIONS: CPT | Performed by: PHYSICAL THERAPIST

## 2020-09-08 PROCEDURE — 97110 THERAPEUTIC EXERCISES: CPT | Performed by: PHYSICAL THERAPIST

## 2020-09-08 NOTE — PROGRESS NOTES
Today's date: 2020  Patient name: Marilu Hercules  : 1960  MRN: [id number]  Referring provider: Mg Grier MD  Dx:   Encounter Diagnosis     ICD-10-CM    1  Aftercare following right shoulder joint replacement surgery  Z47 1     Z96 611    2  Acute pain of right shoulder  M25 511      Subjective:  Gaviota Jon states his arm is slowly feeling better and getting stronger  Objective: See treatment log below  Gaviota Jon continues to be advised to follow his home exercise program as tolerated  When Gaviota Jon is feeling good he follows his rehab exercises in the gym and on other days he follows his home exercise program at home as tolerated  In the future we plan on having Pedro stay at home and follow his home exercise program for four to six weeks and than assess for either more Rehab treatments or discharge from Rehab care  Assessment: Tolerated treatment well  Patient exhibited good technique with therapeutic exercises and would benefit from continued PT  Pedro's goals are to continue to improve with his rehab program and improve with functional mobility, speed, repetition and decreased c/o pain with his gym and home exercise program   Pedro's final goal for his rehab is to be discharged from Rehab care after obtaining his full functional rehab potential     Plan: Continue per plan of care  Progress treatment as tolerated  Precautions: Right Total Shoulder Replacement Surgery    All treatments below will be provided with a focus on strengthening, flexibility, ROM, postural,   endurance and any possible swelling and pain which may be present without ignoring   neural issues involving balance, coordination and proprioception which is also important   and necessary to provide full functional mobility and quality care        Daily Treatment Log  Manual  9/1 9/3 9/8     MT, ROM 30' 30' 25'     HEP/Self Care        Exercise Log 9/1 9/3 9/8     [facility] 10' 10' 10' [facility]-Codmans        [facility]-Curls,Tri        Finger Ladder        Sveta-BP,PD,Lats,Row 35#LatPD Row 25-BP 30x 35#-[id number]  25#BP30x 35#-30x     W/P-PNF,IR,ER,PU,PS,Throw-Top,Mid,Bot 12  5#Top  10#Mid  7  5#Bot30x 12  5#Top  12  5#Mid  12  5#Bot 12  5#Top  10 0#-Mid  7 5#-Bot     W/P-OH 2x10' 2x10' 2x10'     Rotation Bar Sup/Pro        WallSlidesITVY 3x30x 3x30x 3x30x     WallRedBallEx 3x30x 3x30x 3x30x     ME, PE 15' 15' 15'             Modalities 9/1 9/3 9/8     /PE 20' 20' 20'

## 2020-09-10 ENCOUNTER — OFFICE VISIT (OUTPATIENT)
Dept: PHYSICAL THERAPY | Facility: CLINIC | Age: 60
End: 2020-09-10
Payer: COMMERCIAL

## 2020-09-10 DIAGNOSIS — Z96.611 AFTERCARE FOLLOWING RIGHT SHOULDER JOINT REPLACEMENT SURGERY: Primary | ICD-10-CM

## 2020-09-10 DIAGNOSIS — M25.511 ACUTE PAIN OF RIGHT SHOULDER: ICD-10-CM

## 2020-09-10 DIAGNOSIS — Z47.1 AFTERCARE FOLLOWING RIGHT SHOULDER JOINT REPLACEMENT SURGERY: Primary | ICD-10-CM

## 2020-09-10 PROCEDURE — 97110 THERAPEUTIC EXERCISES: CPT | Performed by: PHYSICAL THERAPIST

## 2020-09-10 PROCEDURE — 97140 MANUAL THERAPY 1/> REGIONS: CPT | Performed by: PHYSICAL THERAPIST

## 2020-09-10 PROCEDURE — 97112 NEUROMUSCULAR REEDUCATION: CPT | Performed by: PHYSICAL THERAPIST

## 2020-09-10 NOTE — PROGRESS NOTES
Today's date: 9/10/2020  Patient name: Abbie Agrawal  : 1960  MRN: [id number]  Referring provider: Tony Kurtz MD  Dx:   Encounter Diagnosis     ICD-10-CM    1  Aftercare following right shoulder joint replacement surgery  Z47 1     Z96 611    2  Acute pain of right shoulder  M25 511      Subjective:  Jessenia Nair states his right shoulder is slowly progressing  Objective: See treatment log below  Jessenia Nair continues to be advised to follow his home exercise program as tolerated  When Jessenia Nair is feeling good he follows his rehab exercises in the gym and on other days he follows his home exercise program at home as tolerated  In the future we plan on having ePdro stay at home and follow his home exercise program for four to six weeks and than assess for either more Rehab treatments or discharge from Rehab care  Assessment: Tolerated treatment well  Patient exhibited good technique with therapeutic exercises and would benefit from continued PT  Zhoukelton's goals are to continue to improve with his rehab program and improve with functional mobility, speed, repetition and decreased c/o pain with his gym and home exercise program   Cezarpavel's final goal for his rehab is to be discharged from Rehab care after obtaining his full functional rehab potential     Plan: Continue per plan of care  Progress treatment as tolerated  Precautions: Right Total Shoulder Replacement Surgery    All treatments below will be provided with a focus on strengthening, flexibility, ROM, postural,   endurance and any possible swelling and pain which may be present without ignoring   neural issues involving balance, coordination and proprioception which is also important   and necessary to provide full functional mobility and quality care        Daily Treatment Log  Manual  9/1 9/3 9/8 9/10    MT, ROM 30' 30' 25' 25'    HEP/Self Care        Exercise Log 9/1 9/3 9/8 9/10    [facility] 10' 10' 10' 10' [facility]-Codmans        [facility]-Curls,Tri        Finger Ladder        Sveta-BP,PD,Lats,Row 35#LatPD Row 25-BP 30x 35#-[id number]  25#BP30x 35#-30x 35#-30x    W/P-PNF,IR,ER,PU,PS,Throw-Top,Mid,Bot 12  5#Top  10#Mid  7  5#Bot30x 12  5#Top  12  5#Mid  12  5#Bot 12  5#Top  10 0#-Mid  7 5#-Bot 12 5#-30x  10 0#-30x  7 5#-30x    W/P-OH 2x10' 2x10' 2x10' 2x10'    Rotation Bar Sup/Pro        WallSlidesITVY 3x30x 3x30x 3x30x 3x30x    WallRedBallEx 3x30x 3x30x 3x30x 3x30x    ME, PE 15' 15' 15' 15'            Modalities 9/1 9/3 9/8 9/10    /PE 20' 20' 20' 20'

## 2020-09-15 ENCOUNTER — OFFICE VISIT (OUTPATIENT)
Dept: PHYSICAL THERAPY | Facility: CLINIC | Age: 60
End: 2020-09-15
Payer: COMMERCIAL

## 2020-09-15 DIAGNOSIS — M25.511 ACUTE PAIN OF RIGHT SHOULDER: ICD-10-CM

## 2020-09-15 DIAGNOSIS — Z96.611 AFTERCARE FOLLOWING RIGHT SHOULDER JOINT REPLACEMENT SURGERY: Primary | ICD-10-CM

## 2020-09-15 DIAGNOSIS — Z47.1 AFTERCARE FOLLOWING RIGHT SHOULDER JOINT REPLACEMENT SURGERY: Primary | ICD-10-CM

## 2020-09-15 PROCEDURE — 97140 MANUAL THERAPY 1/> REGIONS: CPT

## 2020-09-15 PROCEDURE — 97112 NEUROMUSCULAR REEDUCATION: CPT

## 2020-09-15 PROCEDURE — 97164 PT RE-EVAL EST PLAN CARE: CPT | Performed by: PHYSICAL THERAPIST

## 2020-09-15 NOTE — LETTER
September 15, 2020  PT Reevaluation [address], [place]  [address] [place] [id number]    Patient: Rios Conte   YOB: 1960   Date of Visit: 9/15/2020     Encounter Diagnosis     ICD-10-CM    1  Aftercare following right shoulder joint replacement surgery  Z47 1     Z96 611    2  Acute pain of right shoulder  M25 511      Dear Dr Richardson Human: Thank you for your recent referral of Rios Conte  Please review the attached evaluation summary from Pedro's recent visit  Please verify that you agree with the plan of care by signing the attached order  If you have any questions or concerns, please do not hesitate to call  I sincerely appreciate the opportunity to share in the care of one of your patients and hope to have another opportunity to work with you in the near future  Sincerely,    Charlie Cruz, PT    Referring Provider:      I certify that I have read the below Plan of Care and certify the need for these services furnished under this plan of treatment while under my care  MD Cele Wood [address] [place] [address] Facsimile: [phone]    Please SIGN ABOVE and return THIS PAGE ONLY to Fax # [phone]        Today's date: 9/15/2020  Patient name: Rios Conte  : 1960  MRN: [id number]  Referring provider: Sheila Kidd MD  Dx:   Encounter Diagnosis     ICD-10-CM    1  Aftercare following right shoulder joint replacement surgery  Z47 1     Z96 611    2  Acute pain of right shoulder  M25 511      Subjective:  No new c/o pain today  Objective: See treatment log below  Lucero Elias continues to be advised to follow his home exercise program as tolerated  When Lucero Elias is feeling good he follows his rehab exercises in the gym and on other days he follows his home exercise program at home as tolerated    In the future we plan on having Pedro stay at home and follow his home exercise program for four to six weeks and than assess for either more Rehab treatments or discharge from Rehab care  Assessment: Tolerated treatment well  Patient exhibited good technique with therapeutic exercises and would benefit from continued PT  Cezarpavel's goals are to continue to improve with his rehab program and improve with functional mobility, speed, repetition and decreased c/o pain with his gym and home exercise program   Pedro's final goal for his rehab is to be discharged from Rehab care after obtaining his full functional rehab potential     Plan: Continue per plan of care  Progress treatment as tolerated  Precautions: Right Total Shoulder Replacement Surgery    All treatments below will be provided with a focus on strengthening, flexibility, ROM, postural,   endurance and any possible swelling and pain which may be present without ignoring   neural issues involving balance, coordination and proprioception which is also important   and necessary to provide full functional mobility and quality care  Daily Treatment Log  Manual  9/1 9/3 9/8 9/10 9/15   MT, ROM 30' 30' 25' 25' 20'   HEP/Self Care        Exercise Log 9/1 9/3 9/8 9/10 9/15   UBC 10' 10' 10' 10' 10'   FWC-Codmans        FWC-Curls,Tri        Finger Ladder        Sveta-BP,PD,Lats,Row 35#LatPD Row 25-BP 30x 35#-[id number]  25#BP30x 35#-30x 35#-30x 35# 30x   W/P-PNF,IR,ER,PU,PS,Throw-Top,Mid,Bot 12  5#Top  10#Mid  7  5#Bot30x 12  5#Top  12  5#Mid  12  5#Bot 12  5#Top  10 0#-Mid  7 5#-Bot 12 5#-30x  10 0#-30x  7 5#-30x 12 5# Top  10# Mid  7  5#Bot30x   W/P-OH 2x10' 2x10' 2x10' 2x10' 2x10'   Rotation Bar Sup/Pro        WallSlidesITVY 3x30x 3x30x 3x30x 3x30x 3x30x   WallRedBallEx 3x30x 3x30x 3x30x 3x30x 3x30   ME, PE 15' 15' 15' 15' 15'           Modalities 9/1 9/3 9/8 9/10 9/15   MH/PE 20' 20' 20' 20' 20'       Attestation signed by Timo Rosenbaum PT at 9/15/2020  5:18 PM:  I supervised the visit    We discussed the case to ensure appropriate continuation and progression of care and I reviewed the documentation  PT Re-Evaluation   Today's date: 9/15/2020     Patient name: Axel Galicia  : 1960  MRN: [id number]  Referring provider: Myles Machado MD  Dx:   Encounter Diagnosis     ICD-10-CM    1  Aftercare following right shoulder joint replacement surgery  Z47 1     Z96 611    2  Acute pain of right shoulder  M25 511      Assessment  Assessment details: Patient is slowly feeling better with less pain and more strength and endurance  Impairments: abnormal or restricted ROM, abnormal movement, activity intolerance, impaired physical strength, pain with function, safety issue, scapular dyskinesis, weight-bearing intolerance and poor body mechanics  Understanding of Dx/Px/POC: excellent   Prognosis: good    Goals  STG 2-4 weeks:   Increase UE strength by 3-6 lbs  Decrease pain by 1-2 levels on 1-10 pain scale  Increase UE PROM by 10-15 Degrees in all planes  Reduce C/O pain with lying on either side  Initiate HEP  LTG 6-8 weeks:   Increase UE strength 10-20 lbs  Demonstrate UE AROM WFL in all planes  Decrease pain to <2  Improve strength by 10-20 lbs  Return to lying on their right or left side with minimal difficulty  Improve sleep status limitations to none  D/C with HEP  Plan  Plan details: All planned modality interventions and planned therapy interventions are provided PRN    Patient would benefit from: PT eval and skilled physical therapy  Planned modality interventions: unattended electrical stimulation  Planned therapy interventions: joint mobilization, manual therapy, neuromuscular re-education, patient education, postural training, self care, strengthening, stretching, therapeutic activities, therapeutic exercise, therapeutic training, transfer training, coordination, flexibility, graded exercise and home exercise program  Frequency: 3x week  Duration in weeks: 12  Treatment plan discussed with: patient      Subjective Evaluation    Pain  Quality: discomfort, knife-like, pulling, squeezing and sharp  Relieving factors: support, rest, relaxation and change in position  Aggravating factors: lifting and overhead activity  Progression: improved    Treatments  Previous treatment: physical therapy  Current treatment: physical therapy  Patient Goals  Patient goals for therapy: decreased pain, increased motion, return to work, return to [employer] activities, independence with ADLs/IADLs and increased strength      Date of onset:  5/22/2020    Date of Surgery:  5/22/2020    History of Present Episode: 6/8/2020  Jas Lau states his right shoulder became almost non-functional   He went for a right shoulder reverse total shoulder replacement  Past Medical History:    6/8/2020  Jas Lau reports diabetes type two  HTN  CABGx2    Previous Level of Functional Ability:  6/8/2020  Jas Lau states his right and left shoulder worked well many years ago but his shoulder went down hill and he required a TSR Sx  Inspection / Palpation:  Shoulder:  6/8/2020  Endomorphic body type  No signs of infection  No signs of wounds  No signs of drainage  Mild signs of ecchymotic regions  Mild signs of erythremic regions  Mild to moderate signs of muscle spasm  Mild to moderate signs of muscle guarding  Moderate signs of tenderness reported to palpation  Mild to moderate signs of swelling  Positive signs of a surgery site  Current conditions appears consistent with recent acute episode  Chief Complaints:  6/8/2020  Jas Lau reports moderate to severe difficulty with bending his right shoulder  Jas Lau reports moderate to severe difficulty with movement of his right shoulder  Jas Lau reports moderate to severe difficulty with use of his right arm  Jas Lau reports moderate to severe difficulty with lifting / elevating his right arm  Jas Lau reports mild difficulty with sleeping  Samantha Blower reports moderate to severe difficulty with his strength and endurance  Samantha Blower reports moderate to severe limitations with his right shoulder range of motion  Samantha Blower reports severe difficulty lying on his right shoulder region      SHOULDER PAIN Resting Palpation Moving Lifting Elevating   6/8/2020 Lt 0 0 0 0 0   6/8/2020 Rt  0 0-2 0-2 0-4 0-4   7/8/2020 Rt 0 0-1 0-1 0-3 0-3   8/11/2020 Rt 0 0-1 0-1 0-2 0-2   9/15/2020 Rt 0 0-1 0-1 0-2 0-2     SHOULDER PAIN Sleeping Throwing Pushing Pulling Twisting   6/8/2020 Lt 0 0 0 0 0   6/8/2020 Rt  0-2 NA 0-4 0-4 0-4   7/8/2020 Rt 0-1 NA 0-3 0-3 0-3   8/11/2020 Rt 0-1 NA 0-2 0-2 0-2   9/15/2020 Rt 0-1 NA 0-1 0-1 0-1     SHOULDER AROM Flexion Extension Abduction   6/8/2020 Lt 175° 60° 175°   6/8/2020 Rt 84° 23° 84°   7/8/2020 Rt 145° 48° 145°   8/11/2020 Rt 167° 56° 167°   9/15/2020 Rt 174° 61° 174°     SHOULDER AROM Hor Add Ext Rotation Int Rotation   6/8/2020 Lt 38° 45° 42°   6/8/2020 Rt 21° 42° 40°   7/8/2020 Rt 29° 44° 42°   8/11/2020 Rt 39° 67° 53°   9/15/2020 Rt 42° 71° 59°     SHLD MMT / PAIN Flexion Extension Abduction   6/8/2020 Lt 0/10  45 lbs 0/10  42 lbs 0/10  25 lbs   6/8/2020 Rt 5/10  2 lbs 5/10  3 lbs 5/10  2 lbs   7/8/2020 Rt 4/10  5 lbs 4/10  6 lbs 4/10  5 lbs   8/11/2020 Rt 3/10   11 lbs 3/10  12 lbs 3/10  11 lbs   9/15/2020 Rt 2/10  15 lbs 2/10  16 lbs 2/10  15 lbs     SHLD MMT / PAIN Hor Add Ext Rotation Int Rotation   6/8/2020 Lt 0/10  34 lbs 0/10  22 lbs 0/10  29 lbs   6/8/2020 Rt 5/10  3 lbs 5/10  2 lbs 5/10  3 lbs   7/8/2020 Rt 4/10  6 lbs 4/10  5 lbs 4/10  6 lbs   8/11 2020 Rt 3/10  10 lbs 3/10  10 lbs 3/10  11 lbs   9/15/2020 Rt 2/10  14 lbs 2/10  14 lbs 2/10  14 lbs     Shoulder Screen Rotator Cuff Apprehension Anterior  Stability Posterior  Stability   6/8/2020 Rt Negative Negative Negative Negative   6/8/2020 Lt Negative Negative Negative Negative     Shoulder Screen AC Joint SC Joint Drop Arm Adhesive Capsulitis   6/8/2020 Rt Negative Negative Negative Negative   6/8/2020 Lt Negative Negative Negative Negative     Precautions: Right Total Shoulder Replacement Surgery

## 2020-09-15 NOTE — PROGRESS NOTES
Today's date: 9/15/2020  Patient name: Barnie Spatz  : 1960  MRN: [id number]  Referring provider: Linda Parikh MD  Dx:   Encounter Diagnosis     ICD-10-CM    1  Aftercare following right shoulder joint replacement surgery  Z47 1     Z96 611    2  Acute pain of right shoulder  M25 511      Subjective:  No new c/o pain today  Objective: See treatment log below  Valentina Bravo continues to be advised to follow his home exercise program as tolerated  When Valentina Bravo is feeling good he follows his rehab exercises in the gym and on other days he follows his home exercise program at home as tolerated  In the future we plan on having Pedro stay at home and follow his home exercise program for four to six weeks and than assess for either more Rehab treatments or discharge from Rehab care  Assessment: Tolerated treatment well  Patient exhibited good technique with therapeutic exercises and would benefit from continued PT  Pedro's goals are to continue to improve with his rehab program and improve with functional mobility, speed, repetition and decreased c/o pain with his gym and home exercise program   Pedro's final goal for his rehab is to be discharged from Rehab care after obtaining his full functional rehab potential     Plan: Continue per plan of care  Progress treatment as tolerated  Precautions: Right Total Shoulder Replacement Surgery    All treatments below will be provided with a focus on strengthening, flexibility, ROM, postural,   endurance and any possible swelling and pain which may be present without ignoring   neural issues involving balance, coordination and proprioception which is also important   and necessary to provide full functional mobility and quality care        Daily Treatment Log  Manual  9/1 9/3 9/8 9/10 9/15   MT, ROM 30' 30' 25' 25' 20'   HEP/Self Care        Exercise Log 9/1 9/3 9/8 9/10 9/15   UBC 10' 10' 10' 10' 10'   [facility]        Finger Ladder        Sveta-BP,PD,Lats,Row 35#LatPD Row 25-BP 30x 35#-[id number]  25#BP30x 35#-30x 35#-30x 35# 30x   W/P-PNF,IR,ER,PU,PS,Throw-Top,Mid,Bot 12  5#Top  10#Mid  7  5#Bot30x 12  5#Top  12  5#Mid  12  5#Bot 12  5#Top  10 0#-Mid  7 5#-Bot 12 5#-30x  10 0#-30x  7 5#-30x 12 5# Top  10# Mid  7  5#Bot30x   W/P-OH 2x10' 2x10' 2x10' 2x10' 2x10'   Rotation Bar Sup/Pro        WallSlidesITVY 3x30x 3x30x 3x30x 3x30x 3x30x   WallRedBallEx 3x30x 3x30x 3x30x 3x30x 3x30   ME, PE 15' 15' 15' 15' 15'           Modalities 9/1 9/3 9/8 9/10 9/15   MH/PE 20' 20' 20' 20' 20'

## 2020-09-17 ENCOUNTER — TRANSCRIBE ORDERS (OUTPATIENT)
Dept: PHYSICAL THERAPY | Facility: CLINIC | Age: 60
End: 2020-09-17

## 2020-09-17 ENCOUNTER — OFFICE VISIT (OUTPATIENT)
Dept: PHYSICAL THERAPY | Facility: CLINIC | Age: 60
End: 2020-09-17
Payer: COMMERCIAL

## 2020-09-17 DIAGNOSIS — M25.511 ACUTE PAIN OF RIGHT SHOULDER: ICD-10-CM

## 2020-09-17 DIAGNOSIS — Z96.611 AFTERCARE FOLLOWING RIGHT SHOULDER JOINT REPLACEMENT SURGERY: Primary | ICD-10-CM

## 2020-09-17 DIAGNOSIS — Z47.1 AFTERCARE FOLLOWING RIGHT SHOULDER JOINT REPLACEMENT SURGERY: Primary | ICD-10-CM

## 2020-09-17 PROCEDURE — 97112 NEUROMUSCULAR REEDUCATION: CPT

## 2020-09-17 PROCEDURE — 97140 MANUAL THERAPY 1/> REGIONS: CPT

## 2020-09-17 NOTE — PROGRESS NOTES
Today's date: 2020  Patient name: Rohini Gilmore  : 1960  MRN: [id number]  Referring provider: Claudine Soliz MD  Dx:   Encounter Diagnosis     ICD-10-CM    1  Aftercare following right shoulder joint replacement surgery  Z47 1     Z96 611    2  Acute pain of right shoulder  M25 511      Subjective:  No new c/o pain today  Objective: See treatment log below  Parmjit Maxwell continues to be advised to follow his home exercise program as tolerated  When Parmjit Maxwell is feeling good he follows his rehab exercises in the gym and on other days he follows his home exercise program at home as tolerated  In the future we plan on having Pedro stay at home and follow his home exercise program for four to six weeks and than assess for either more Rehab treatments or discharge from Rehab care  Assessment: Tolerated treatment well  Patient exhibited good technique with therapeutic exercises and would benefit from continued PT to increase R shoulder ROM/strength to improve mobility  Pedro's goals are to continue to improve with his rehab program and improve with functional mobility, speed, repetition and decreased c/o pain with his gym and home exercise program   Pedro's final goal for his rehab is to be discharged from Rehab care after obtaining his full functional rehab potential     Plan: Continue per plan of care  Progress treatment as tolerated  Precautions: Right Total Shoulder Replacement Surgery    All treatments below will be provided with a focus on strengthening, flexibility, ROM, postural,   endurance and any possible swelling and pain which may be present without ignoring   neural issues involving balance, coordination and proprioception which is also important   and necessary to provide full functional mobility and quality care        Daily Treatment Log  Manual  9/17    9/15   MT, ROM 30'    20'   HEP/Self Care        Exercise Log 9/17    9/15   UBC 10'    10' FW-Codmans        FW-Curls,Tri        Finger Ladder        Sveta-BP,PD,Lats,Row 35#LatPD Row 25-BP 30x    35# 30x   W/P-PNF,IR,ER,PU,PS,Throw-Top,Mid,Bot 12  5#Top  10#Mid  7  5#Bot30x    12 5# Top  10# Mid  7  5#Bot30x   W/P-OH 2x10'    2x10'   Rotation Bar Sup/Pro        WallSlidesITVY 3x30x    3x30x   Naldo Akhil 3x30x    3x30   ME, PE 15'    15'           Modalities 9/17    9/15   MH/PE 20'    20'

## 2020-09-17 NOTE — PROGRESS NOTES
PT Re-Evaluation   Today's date: 9/15/2020     Patient name: Margarita Ruffin  : 1960  MRN: [id number]  Referring provider: Rebecca Cuba MD  Dx:   Encounter Diagnosis     ICD-10-CM    1  Aftercare following right shoulder joint replacement surgery  Z47 1     Z96 611    2  Acute pain of right shoulder  M25 511      Assessment  Assessment details: Patient is slowly feeling better with less pain and more strength and endurance  Impairments: abnormal or restricted ROM, abnormal movement, activity intolerance, impaired physical strength, pain with function, safety issue, scapular dyskinesis, weight-bearing intolerance and poor body mechanics  Understanding of Dx/Px/POC: excellent   Prognosis: good    Goals  STG 2-4 weeks:   Increase UE strength by 3-6 lbs  Decrease pain by 1-2 levels on 1-10 pain scale  Increase UE PROM by 10-15 Degrees in all planes  Reduce C/O pain with lying on either side  Initiate HEP  LTG 6-8 weeks:   Increase UE strength 10-20 lbs  Demonstrate UE AROM WFL in all planes  Decrease pain to <2  Improve strength by 10-20 lbs  Return to lying on their right or left side with minimal difficulty  Improve sleep status limitations to none  D/C with HEP  Plan  Plan details: All planned modality interventions and planned therapy interventions are provided PRN    Patient would benefit from: PT eval and skilled physical therapy  Planned modality interventions: unattended electrical stimulation  Planned therapy interventions: joint mobilization, manual therapy, neuromuscular re-education, patient education, postural training, self care, strengthening, stretching, therapeutic activities, therapeutic exercise, therapeutic training, transfer training, coordination, flexibility, graded exercise and home exercise program  Frequency: 3x week  Duration in weeks: 12  Treatment plan discussed with: patient      Subjective Evaluation    Pain  Quality: discomfort, knife-like, pulling, squeezing and sharp  Relieving factors: support, rest, relaxation and change in position  Aggravating factors: lifting and overhead activity  Progression: improved    Treatments  Previous treatment: physical therapy  Current treatment: physical therapy  Patient Goals  Patient goals for therapy: decreased pain, increased motion, return to work, return to [employer] activities, independence with ADLs/IADLs and increased strength      Date of onset:  5/22/2020    Date of Surgery:  5/22/2020    History of Present Episode: 6/8/2020  Cheryl Baker states his right shoulder became almost non-functional   He went for a right shoulder reverse total shoulder replacement  Past Medical History:    6/8/2020  Cheryl Baker reports diabetes type two  HTN  CABGx2    Previous Level of Functional Ability:  6/8/2020  Cheryl Baker states his right and left shoulder worked well many years ago but his shoulder went down hill and he required a TSR Sx  Inspection / Palpation:  Shoulder:  6/8/2020  Endomorphic body type  No signs of infection  No signs of wounds  No signs of drainage  Mild signs of ecchymotic regions  Mild signs of erythremic regions  Mild to moderate signs of muscle spasm  Mild to moderate signs of muscle guarding  Moderate signs of tenderness reported to palpation  Mild to moderate signs of swelling  Positive signs of a surgery site  Current conditions appears consistent with recent acute episode  Chief Complaints:  6/8/2020  Ghanshyamsamia Peterleonid reports moderate to severe difficulty with bending his right shoulder  Cheryl Baker reports moderate to severe difficulty with movement of his right shoulder  Cheryl Baker reports moderate to severe difficulty with use of his right arm  Cheryl Baker reports moderate to severe difficulty with lifting / elevating his right arm  Cheryl Baker reports mild difficulty with sleeping  Cheryl Baker reports moderate to severe difficulty with his strength and endurance  Cheryl Baker reports moderate to severe limitations with his right shoulder range of motion  Cheryl Baker reports severe difficulty lying on his right shoulder region      SHOULDER PAIN Resting Palpation Moving Lifting Elevating   6/8/2020 Lt 0 0 0 0 0   6/8/2020 Rt  0 0-2 0-2 0-4 0-4   7/8/2020 Rt 0 0-1 0-1 0-3 0-3   8/11/2020 Rt 0 0-1 0-1 0-2 0-2   9/15/2020 Rt 0 0-1 0-1 0-2 0-2     SHOULDER PAIN Sleeping Throwing Pushing Pulling Twisting   6/8/2020 Lt 0 0 0 0 0   6/8/2020 Rt  0-2 NA 0-4 0-4 0-4   7/8/2020 Rt 0-1 NA 0-3 0-3 0-3   8/11/2020 Rt 0-1 NA 0-2 0-2 0-2   9/15/2020 Rt 0-1 NA 0-1 0-1 0-1     SHOULDER AROM Flexion Extension Abduction   6/8/2020 Lt 175° 60° 175°   6/8/2020 Rt 84° 23° 84°   7/8/2020 Rt 145° 48° 145°   8/11/2020 Rt 167° 56° 167°   9/15/2020 Rt 174° 61° 174°     SHOULDER AROM Hor Add Ext Rotation Int Rotation   6/8/2020 Lt 38° 45° 42°   6/8/2020 Rt 21° 42° 40°   7/8/2020 Rt 29° 44° 42°   8/11/2020 Rt 39° 67° 53°   9/15/2020 Rt 42° 71° 59°     SHLD MMT / PAIN Flexion Extension Abduction   6/8/2020 Lt 0/10  45 lbs 0/10  42 lbs 0/10  25 lbs   6/8/2020 Rt 5/10  2 lbs 5/10  3 lbs 5/10  2 lbs   7/8/2020 Rt 4/10  5 lbs 4/10  6 lbs 4/10  5 lbs   8/11/2020 Rt 3/10   11 lbs 3/10  12 lbs 3/10  11 lbs   9/15/2020 Rt 2/10  15 lbs 2/10  16 lbs 2/10  15 lbs     SHLD MMT / PAIN Hor Add Ext Rotation Int Rotation   6/8/2020 Lt 0/10  34 lbs 0/10  22 lbs 0/10  29 lbs   6/8/2020 Rt 5/10  3 lbs 5/10  2 lbs 5/10  3 lbs   7/8/2020 Rt 4/10  6 lbs 4/10  5 lbs 4/10  6 lbs   8/11 2020 Rt 3/10  10 lbs 3/10  10 lbs 3/10  11 lbs   9/15/2020 Rt 2/10  14 lbs 2/10  14 lbs 2/10  14 lbs     Shoulder Screen Rotator Cuff Apprehension Anterior  Stability Posterior  Stability   6/8/2020 Rt Negative Negative Negative Negative   6/8/2020 Lt Negative Negative Negative Negative     Shoulder Screen AC Joint SC Joint Drop Arm Adhesive Capsulitis   6/8/2020 Rt Negative Negative Negative Negative   6/8/2020 Lt Negative Negative Negative Negative     Precautions: Right Total Shoulder Replacement Surgery

## 2020-09-22 ENCOUNTER — OFFICE VISIT (OUTPATIENT)
Dept: PHYSICAL THERAPY | Facility: CLINIC | Age: 60
End: 2020-09-22
Payer: COMMERCIAL

## 2020-09-22 DIAGNOSIS — M25.511 ACUTE PAIN OF RIGHT SHOULDER: ICD-10-CM

## 2020-09-22 DIAGNOSIS — Z96.611 AFTERCARE FOLLOWING RIGHT SHOULDER JOINT REPLACEMENT SURGERY: Primary | ICD-10-CM

## 2020-09-22 DIAGNOSIS — Z47.1 AFTERCARE FOLLOWING RIGHT SHOULDER JOINT REPLACEMENT SURGERY: Primary | ICD-10-CM

## 2020-09-22 PROCEDURE — 97112 NEUROMUSCULAR REEDUCATION: CPT

## 2020-09-22 PROCEDURE — 97140 MANUAL THERAPY 1/> REGIONS: CPT

## 2020-09-22 NOTE — PROGRESS NOTES
Today's date: 2020  Patient name: Marilu Hercules  : 1960  MRN: [id number]  Referring provider: Mg Grier MD  Dx:   Encounter Diagnosis     ICD-10-CM    1  Aftercare following right shoulder joint replacement surgery  Z47 1     Z96 611    2  Acute pain of right shoulder  M25 511      Subjective:  No new c/o pain today  Objective: See treatment log below  Gaviota Jon continues to be advised to follow his home exercise program as tolerated  When Gaviota Jon is feeling good he follows his rehab exercises in the gym and on other days he follows his home exercise program at home as tolerated  In the future we plan on having Pedro stay at home and follow his home exercise program for four to six weeks and than assess for either more Rehab treatments or discharge from Rehab care  Assessment: Tolerated treatment well  Patient exhibited good technique with therapeutic exercises and would benefit from continued PT to increase R shoulder ROM/strength and endurance to improve mobility  Pedro's goals are to continue to improve with his rehab program and improve with functional mobility, speed, repetition and decreased c/o pain with his gym and home exercise program   Pedro's final goal for his rehab is to be discharged from Rehab care after obtaining his full functional rehab potential     Plan: Continue per plan of care  Progress treatment as tolerated  Precautions: Right Total Shoulder Replacement Surgery    All treatments below will be provided with a focus on strengthening, flexibility, ROM, postural,   endurance and any possible swelling and pain which may be present without ignoring   neural issues involving balance, coordination and proprioception which is also important   and necessary to provide full functional mobility and quality care        Daily Treatment Log  Manual        MT, ROM 30' 20'      HEP/Self Care        Exercise Log       UBC 10' 10'      Sveta-BP,PD,Lats,Row 35#LatPD Row 25-BP 30x 30#LatPDRow  25#BP 30x      W/P-PNF,IR,ER,PU,PS,Throw-Top,Mid,Bot 12  5#Top  10#Mid  7  5#Bot30x 12  5#Top  10#Mid  7  5#Bot30x      W/P-OH 2x10' 2x10'      WallSlidesITVY 3x30x 3x30      WallRedBallEx 3x30x 3x30      ME, PE 13' 15'              Modalities 9/17 9/22      MH/PE 20' 20'

## 2020-09-24 ENCOUNTER — OFFICE VISIT (OUTPATIENT)
Dept: PHYSICAL THERAPY | Facility: CLINIC | Age: 60
End: 2020-09-24
Payer: COMMERCIAL

## 2020-09-24 DIAGNOSIS — Z96.611 AFTERCARE FOLLOWING RIGHT SHOULDER JOINT REPLACEMENT SURGERY: Primary | ICD-10-CM

## 2020-09-24 DIAGNOSIS — M25.511 ACUTE PAIN OF RIGHT SHOULDER: ICD-10-CM

## 2020-09-24 DIAGNOSIS — Z47.1 AFTERCARE FOLLOWING RIGHT SHOULDER JOINT REPLACEMENT SURGERY: Primary | ICD-10-CM

## 2020-09-24 PROCEDURE — 97110 THERAPEUTIC EXERCISES: CPT | Performed by: PHYSICAL THERAPIST

## 2020-09-24 PROCEDURE — 97112 NEUROMUSCULAR REEDUCATION: CPT | Performed by: PHYSICAL THERAPIST

## 2020-09-24 PROCEDURE — 97140 MANUAL THERAPY 1/> REGIONS: CPT | Performed by: PHYSICAL THERAPIST

## 2020-09-24 NOTE — PROGRESS NOTES
Today's date: 2020  Patient name: Naheed Quarles  : 1960  MRN: [id number]  Referring provider: Laurie Lopez MD  Dx:   Encounter Diagnosis     ICD-10-CM    1  Aftercare following right shoulder joint replacement surgery  Z47 1     Z96 611    2  Acute pain of right shoulder  M25 [id number]      Subjective:  Kiley Marquez states his right shoulder is doing well today  His left / other shoulder hurts more now  He is making progress  Objective: See treatment log below  Kiley Marquez continues to be advised to follow his home exercise program as tolerated  When Kiley Marquez is feeling good he follows his rehab exercises in the gym and on other days he follows his home exercise program at home as tolerated  In the future we plan on having Pedro stay at home and follow his home exercise program for four to six weeks and than assess for either more Rehab treatments or discharge from Rehab care  Assessment: Tolerated treatment well  Patient exhibited good technique with therapeutic exercises and would benefit from continued PT  Pedro's goals are to continue to improve with his rehab program and improve with functional mobility, speed, repetition and decreased c/o pain with his gym and home exercise program   Pedro's final goal for his rehab is to be discharged from Rehab care after obtaining his full functional rehab potential     Plan: Continue per plan of care  Progress treatment as tolerated  Precautions: Right Total Shoulder Replacement Surgery    All treatments below will be provided with a focus on strengthening, flexibility, ROM, postural,   endurance and any possible swelling and pain which may be present without ignoring   neural issues involving balance, coordination and proprioception which is also important   and necessary to provide full functional mobility and quality care        Daily Treatment Log  Manual       MT, ROM 30' 20' 25'     HEP/Self Care Exercise Log 9/17 9/22 9/24     [facility] 10' 10' 10'     Sveta-BP,PD,Lats,Row 35#LatPD Row 25-BP 30x 30#LatPDRow  25#BP 30x 30#TvxKYRix31#BP 30x     W/P-PNF,IR,ER,PU,PS,Throw-Top,Mid,Bot 12  5#Top  10#Mid  7  5#Bot30x 12  5#Top  10#Mid  7  5#Bot30x 12 5# Top  10# Mid  7  5#Bot30x     W/P-OH 2x10' 2x10' 2x10'     WallSlidesITVY 3x30x 3x30 3x30     WallRedBallEx 3x30x 3x30 3x30     ME, PE 15' 15' 15'             Modalities 9/17 9/22 9/24     MH/PE 20' 20' 20'

## 2020-09-29 ENCOUNTER — OFFICE VISIT (OUTPATIENT)
Dept: PHYSICAL THERAPY | Facility: CLINIC | Age: 60
End: 2020-09-29
Payer: COMMERCIAL

## 2020-09-29 DIAGNOSIS — Z47.1 AFTERCARE FOLLOWING RIGHT SHOULDER JOINT REPLACEMENT SURGERY: Primary | ICD-10-CM

## 2020-09-29 DIAGNOSIS — Z96.611 AFTERCARE FOLLOWING RIGHT SHOULDER JOINT REPLACEMENT SURGERY: Primary | ICD-10-CM

## 2020-09-29 DIAGNOSIS — M25.511 ACUTE PAIN OF RIGHT SHOULDER: ICD-10-CM

## 2020-09-29 PROCEDURE — 97112 NEUROMUSCULAR REEDUCATION: CPT | Performed by: PHYSICAL THERAPIST

## 2020-09-29 PROCEDURE — 97110 THERAPEUTIC EXERCISES: CPT | Performed by: PHYSICAL THERAPIST

## 2020-09-29 PROCEDURE — 97140 MANUAL THERAPY 1/> REGIONS: CPT | Performed by: PHYSICAL THERAPIST

## 2020-09-29 NOTE — PROGRESS NOTES
Today's date: 2020  Patient name: Francia Feldman  : 1960  MRN: [id number]  Referring provider: Agustín Rachel MD  Dx:   Encounter Diagnosis     ICD-10-CM    1  Aftercare following right shoulder joint replacement surgery  Z47 1     Z96 611    2  Acute pain of right shoulder  M25 [id number]      Subjective:  Gayathri Walters states his right shoulder is slowly feeling better  His main issue is weakness at his time  Objective: See treatment log below  Gayathri Walters continues to be advised to follow his home exercise program as tolerated  When Gayathri Walters is feeling good he follows his rehab exercises in the gym and on other days he follows his home exercise program at home as tolerated  In the future we plan on having Pedro stay at home and follow his home exercise program for four to six weeks and than assess for either more Rehab treatments or discharge from Rehab care  Assessment: Tolerated treatment well  Patient exhibited good technique with therapeutic exercises and would benefit from continued PT  Pedro's goals are to continue to improve with his rehab program and improve with functional mobility, speed, repetition and decreased c/o pain with his gym and home exercise program   Pedro's final goal for his rehab is to be discharged from Rehab care after obtaining his full functional rehab potential     Plan: Continue per plan of care  Progress treatment as tolerated  Precautions: Right Total Shoulder Replacement Surgery    All treatments below will be provided with a focus on strengthening, flexibility, ROM, postural,   endurance and any possible swelling and pain which may be present without ignoring   neural issues involving balance, coordination and proprioception which is also important   and necessary to provide full functional mobility and quality care        Daily Treatment Log  Manual      MT, ROM 30' 20' 25' 25'    HEP/Self Care        Exercise Log 9/17 9/22 9/24 9/29    [facility] 10' 10' 10' 10'    Sveta-BP,PD,Lats,Row 35#LatPD Row 25-BP 30x 30#LatPDRow  25#BP 30x 30#GylAENur28#BP 30x 35# PDR / BP x 30x    W/P-PNF,IR,ER,PU,PS,Throw-Top,Mid,Bot 12  5#Top  10#Mid  7  5#Bot30x 12  5#Top  10#Mid  7  5#Bot30x 12 5# Top  10# Mid  7  5#Bot30x 12 5# Top  10# Mid  7  5#Bot30x    W/P-OH 2x10' 2x10' 2x10' 2x10'    WallSlidesITVY 3x30x 3x30 3x30 3x30    WallRedBallEx 3x30x 3x30 3x30 3x30    ME, PE 15' 15' 15' 15'            Modalities 9/17 9/22 9/24 9/29    /PE 20' 20' 20'

## 2020-10-01 ENCOUNTER — OFFICE VISIT (OUTPATIENT)
Dept: PHYSICAL THERAPY | Facility: CLINIC | Age: 60
End: 2020-10-01
Payer: COMMERCIAL

## 2020-10-01 DIAGNOSIS — M25.511 ACUTE PAIN OF RIGHT SHOULDER: ICD-10-CM

## 2020-10-01 DIAGNOSIS — Z47.1 AFTERCARE FOLLOWING RIGHT SHOULDER JOINT REPLACEMENT SURGERY: Primary | ICD-10-CM

## 2020-10-01 DIAGNOSIS — Z96.611 AFTERCARE FOLLOWING RIGHT SHOULDER JOINT REPLACEMENT SURGERY: Primary | ICD-10-CM

## 2020-10-01 PROCEDURE — 97140 MANUAL THERAPY 1/> REGIONS: CPT

## 2020-10-01 PROCEDURE — 97112 NEUROMUSCULAR REEDUCATION: CPT

## 2020-10-06 ENCOUNTER — OFFICE VISIT (OUTPATIENT)
Dept: PHYSICAL THERAPY | Facility: CLINIC | Age: 60
End: 2020-10-06
Payer: COMMERCIAL

## 2020-10-06 DIAGNOSIS — Z47.1 AFTERCARE FOLLOWING RIGHT SHOULDER JOINT REPLACEMENT SURGERY: Primary | ICD-10-CM

## 2020-10-06 DIAGNOSIS — Z96.611 AFTERCARE FOLLOWING RIGHT SHOULDER JOINT REPLACEMENT SURGERY: Primary | ICD-10-CM

## 2020-10-06 DIAGNOSIS — M25.511 ACUTE PAIN OF RIGHT SHOULDER: ICD-10-CM

## 2020-10-06 PROCEDURE — 97140 MANUAL THERAPY 1/> REGIONS: CPT

## 2020-10-06 PROCEDURE — 97112 NEUROMUSCULAR REEDUCATION: CPT

## 2020-10-08 ENCOUNTER — OFFICE VISIT (OUTPATIENT)
Dept: PHYSICAL THERAPY | Facility: CLINIC | Age: 60
End: 2020-10-08
Payer: COMMERCIAL

## 2020-10-08 DIAGNOSIS — M25.511 ACUTE PAIN OF RIGHT SHOULDER: ICD-10-CM

## 2020-10-08 DIAGNOSIS — Z96.611 AFTERCARE FOLLOWING RIGHT SHOULDER JOINT REPLACEMENT SURGERY: Primary | ICD-10-CM

## 2020-10-08 DIAGNOSIS — Z47.1 AFTERCARE FOLLOWING RIGHT SHOULDER JOINT REPLACEMENT SURGERY: Primary | ICD-10-CM

## 2020-10-08 PROCEDURE — 97140 MANUAL THERAPY 1/> REGIONS: CPT | Performed by: PHYSICAL THERAPIST

## 2020-10-08 PROCEDURE — 97112 NEUROMUSCULAR REEDUCATION: CPT | Performed by: PHYSICAL THERAPIST

## 2020-10-08 PROCEDURE — 97110 THERAPEUTIC EXERCISES: CPT | Performed by: PHYSICAL THERAPIST

## 2020-10-13 ENCOUNTER — OFFICE VISIT (OUTPATIENT)
Dept: PHYSICAL THERAPY | Facility: CLINIC | Age: 60
End: 2020-10-13
Payer: COMMERCIAL

## 2020-10-13 DIAGNOSIS — Z96.611 AFTERCARE FOLLOWING RIGHT SHOULDER JOINT REPLACEMENT SURGERY: Primary | ICD-10-CM

## 2020-10-13 DIAGNOSIS — Z47.1 AFTERCARE FOLLOWING RIGHT SHOULDER JOINT REPLACEMENT SURGERY: Primary | ICD-10-CM

## 2020-10-13 DIAGNOSIS — M25.511 ACUTE PAIN OF RIGHT SHOULDER: ICD-10-CM

## 2020-10-13 PROCEDURE — 97140 MANUAL THERAPY 1/> REGIONS: CPT | Performed by: PHYSICAL THERAPIST

## 2020-10-13 PROCEDURE — 97110 THERAPEUTIC EXERCISES: CPT | Performed by: PHYSICAL THERAPIST

## 2020-10-13 PROCEDURE — 97112 NEUROMUSCULAR REEDUCATION: CPT | Performed by: PHYSICAL THERAPIST

## 2020-10-15 ENCOUNTER — OFFICE VISIT (OUTPATIENT)
Dept: PHYSICAL THERAPY | Facility: CLINIC | Age: 60
End: 2020-10-15
Payer: COMMERCIAL

## 2020-10-15 DIAGNOSIS — Z96.611 AFTERCARE FOLLOWING RIGHT SHOULDER JOINT REPLACEMENT SURGERY: Primary | ICD-10-CM

## 2020-10-15 DIAGNOSIS — Z47.1 AFTERCARE FOLLOWING RIGHT SHOULDER JOINT REPLACEMENT SURGERY: Primary | ICD-10-CM

## 2020-10-15 DIAGNOSIS — M25.511 ACUTE PAIN OF RIGHT SHOULDER: ICD-10-CM

## 2020-10-15 PROCEDURE — 97112 NEUROMUSCULAR REEDUCATION: CPT | Performed by: PHYSICAL THERAPIST

## 2020-10-15 PROCEDURE — 97140 MANUAL THERAPY 1/> REGIONS: CPT | Performed by: PHYSICAL THERAPIST

## 2020-10-15 PROCEDURE — 97110 THERAPEUTIC EXERCISES: CPT | Performed by: PHYSICAL THERAPIST

## 2020-10-20 ENCOUNTER — OFFICE VISIT (OUTPATIENT)
Dept: PHYSICAL THERAPY | Facility: CLINIC | Age: 60
End: 2020-10-20
Payer: COMMERCIAL

## 2020-10-20 DIAGNOSIS — M25.511 ACUTE PAIN OF RIGHT SHOULDER: ICD-10-CM

## 2020-10-20 DIAGNOSIS — Z47.1 AFTERCARE FOLLOWING RIGHT SHOULDER JOINT REPLACEMENT SURGERY: Primary | ICD-10-CM

## 2020-10-20 DIAGNOSIS — Z96.611 AFTERCARE FOLLOWING RIGHT SHOULDER JOINT REPLACEMENT SURGERY: Primary | ICD-10-CM

## 2020-10-20 PROCEDURE — 97164 PT RE-EVAL EST PLAN CARE: CPT | Performed by: PHYSICAL THERAPIST

## 2020-10-20 PROCEDURE — 97112 NEUROMUSCULAR REEDUCATION: CPT

## 2020-10-20 PROCEDURE — 97140 MANUAL THERAPY 1/> REGIONS: CPT

## 2020-10-21 ENCOUNTER — TRANSCRIBE ORDERS (OUTPATIENT)
Dept: PHYSICAL THERAPY | Facility: CLINIC | Age: 60
End: 2020-10-21

## 2020-10-21 DIAGNOSIS — Z96.611 AFTERCARE FOLLOWING RIGHT SHOULDER JOINT REPLACEMENT SURGERY: Primary | ICD-10-CM

## 2020-10-21 DIAGNOSIS — M25.511 ACUTE PAIN OF RIGHT SHOULDER: ICD-10-CM

## 2020-10-21 DIAGNOSIS — Z47.1 AFTERCARE FOLLOWING RIGHT SHOULDER JOINT REPLACEMENT SURGERY: Primary | ICD-10-CM

## 2020-10-22 ENCOUNTER — OFFICE VISIT (OUTPATIENT)
Dept: PHYSICAL THERAPY | Facility: CLINIC | Age: 60
End: 2020-10-22
Payer: COMMERCIAL

## 2020-10-22 DIAGNOSIS — M25.511 ACUTE PAIN OF RIGHT SHOULDER: ICD-10-CM

## 2020-10-22 DIAGNOSIS — Z96.611 AFTERCARE FOLLOWING RIGHT SHOULDER JOINT REPLACEMENT SURGERY: Primary | ICD-10-CM

## 2020-10-22 DIAGNOSIS — Z47.1 AFTERCARE FOLLOWING RIGHT SHOULDER JOINT REPLACEMENT SURGERY: Primary | ICD-10-CM

## 2020-10-22 PROCEDURE — 97140 MANUAL THERAPY 1/> REGIONS: CPT

## 2020-10-22 PROCEDURE — 97112 NEUROMUSCULAR REEDUCATION: CPT

## 2020-10-27 ENCOUNTER — APPOINTMENT (OUTPATIENT)
Dept: PHYSICAL THERAPY | Facility: CLINIC | Age: 60
End: 2020-10-27
Payer: COMMERCIAL

## 2020-10-29 ENCOUNTER — APPOINTMENT (OUTPATIENT)
Dept: PHYSICAL THERAPY | Facility: CLINIC | Age: 60
End: 2020-10-29
Payer: COMMERCIAL

## 2024-01-15 PROCEDURE — 88305 TISSUE EXAM BY PATHOLOGIST: CPT | Performed by: PATHOLOGY

## 2024-01-16 ENCOUNTER — LAB REQUISITION (OUTPATIENT)
Dept: LAB | Facility: HOSPITAL | Age: 64
End: 2024-01-16
Payer: COMMERCIAL

## 2024-01-16 DIAGNOSIS — G56.02 CARPAL TUNNEL SYNDROME, LEFT UPPER LIMB: ICD-10-CM

## (undated) DEVICE — GLOVE INDICATOR PI UNDERGLOVE SZ 7 BLUE

## (undated) DEVICE — POSITIONER OSI BEACH CHAIR FOAM

## (undated) DEVICE — SCD SEQUENTIAL COMPRESSION COMFORT SLEEVE MEDIUM KNEE LENGTH: Brand: KENDALL SCD

## (undated) DEVICE — SUT ETHIBOND 5 V-37 30 IN MB66G

## (undated) DEVICE — SUT MONOCRYL 3-0 PS-2 27 IN Y427H

## (undated) DEVICE — GLOVE SRG BIOGEL 6.5

## (undated) DEVICE — CHLORAPREP HI-LITE 26ML ORANGE

## (undated) DEVICE — GLOVE INDICATOR PI UNDERGLOVE SZ 8 BLUE

## (undated) DEVICE — SUT FIBERWIRE #2 1/2 CIRCLE T-5 38IN AR-7200

## (undated) DEVICE — PREP SURGICAL PURPREP 26ML

## (undated) DEVICE — 3M™ IOBAN™ 2 ANTIMICROBIAL INCISE DRAPE 6648EZ: Brand: IOBAN™ 2

## (undated) DEVICE — SURGICAL GOWN, XL SMARTSLEEVE: Brand: CONVERTORS

## (undated) DEVICE — FRAZIER SUCTION INSTRUMENT 18 FR W/OBTURATOR, NO CONTROL VENT: Brand: FRAZIER

## (undated) DEVICE — COBAN 6 IN STERILE

## (undated) DEVICE — OCCLUSIVE GAUZE STRIP,3% BISMUTH TRIBROMOPHENATE IN PETROLATUM BLEND: Brand: XEROFORM

## (undated) DEVICE — GLOVE SRG BIOGEL 8

## (undated) DEVICE — SCD SEQUENTIAL COMPRESSION COMFORT SLEEVE LARGE KNEE LENGTH: Brand: KENDALL SCD

## (undated) DEVICE — 3M™ STERI-STRIP™ REINFORCED ADHESIVE SKIN CLOSURES, R1547, 1/2 IN X 4 IN (12 MM X 100 MM), 6 STRIPS/ENVELOPE: Brand: 3M™ STERI-STRIP™

## (undated) DEVICE — CAPIT SHOULDER TOTAL APEX

## (undated) DEVICE — 3000CC GUARDIAN II: Brand: GUARDIAN

## (undated) DEVICE — 3M™ MICROFOAM™ TAPE 1528-4: Brand: 3M™ MICROFOAM™

## (undated) DEVICE — SUT VICRYL 2-0 CT-2 27 IN J269H

## (undated) DEVICE — THE SIMPULSE SOLO SYSTEM WITH ULTREX RETRACTABLE SPLASH SHIELD TIP: Brand: SIMPULSE SOLO

## (undated) DEVICE — STRL ALLENTOWN HIP SHOULDER PK: Brand: CARDINAL HEALTH

## (undated) DEVICE — BLADE SAGITTAL 63.0 X 19.5MM

## (undated) DEVICE — CHEST ROLL FOAM POSITIONER: Brand: CARDINAL HEALTH

## (undated) DEVICE — IMPERVIOUS STOCKINETTE: Brand: DEROYAL

## (undated) DEVICE — PLUMEPEN PRO 10FT

## (undated) DEVICE — INTENDED FOR TISSUE SEPARATION, AND OTHER PROCEDURES THAT REQUIRE A SHARP SURGICAL BLADE TO PUNCTURE OR CUT.: Brand: BARD-PARKER ® CARBON RIB-BACK BLADES